# Patient Record
Sex: FEMALE | Race: BLACK OR AFRICAN AMERICAN | Employment: OTHER | URBAN - METROPOLITAN AREA
[De-identification: names, ages, dates, MRNs, and addresses within clinical notes are randomized per-mention and may not be internally consistent; named-entity substitution may affect disease eponyms.]

---

## 2023-11-17 PROBLEM — I61.9 INTRACEREBRAL HEMORRHAGE, NONTRAUMATIC (HCC): Status: ACTIVE | Noted: 2023-11-17

## 2023-12-01 ENCOUNTER — HOSPITAL ENCOUNTER (INPATIENT)
Facility: HOSPITAL | Age: 53
DRG: 058 | End: 2023-12-01
Attending: EMERGENCY MEDICINE | Admitting: EMERGENCY MEDICINE
Payer: MEDICAID

## 2023-12-01 DIAGNOSIS — I62.9 INTRACRANIAL HEMORRHAGE (HCC): Primary | ICD-10-CM

## 2023-12-01 PROCEDURE — 6370000000 HC RX 637 (ALT 250 FOR IP): Performed by: EMERGENCY MEDICINE

## 2023-12-01 PROCEDURE — 1180000000 HC REHAB R&B

## 2023-12-01 RX ORDER — LEVOTHYROXINE SODIUM 88 UG/1
88 TABLET ORAL
Status: DISCONTINUED | OUTPATIENT
Start: 2023-12-02 | End: 2023-12-20 | Stop reason: HOSPADM

## 2023-12-01 RX ORDER — LEVETIRACETAM 500 MG/1
500 TABLET ORAL 2 TIMES DAILY
Status: DISCONTINUED | OUTPATIENT
Start: 2023-12-01 | End: 2023-12-20 | Stop reason: HOSPADM

## 2023-12-01 RX ORDER — ACETAMINOPHEN 325 MG/1
650 TABLET ORAL EVERY 4 HOURS PRN
Status: DISCONTINUED | OUTPATIENT
Start: 2023-12-01 | End: 2023-12-20 | Stop reason: HOSPADM

## 2023-12-01 RX ORDER — POLYETHYLENE GLYCOL 3350 17 G/17G
17 POWDER, FOR SOLUTION ORAL DAILY PRN
Status: DISCONTINUED | OUTPATIENT
Start: 2023-12-01 | End: 2023-12-20 | Stop reason: HOSPADM

## 2023-12-01 RX ORDER — AMLODIPINE BESYLATE 10 MG/1
10 TABLET ORAL DAILY
Status: DISCONTINUED | OUTPATIENT
Start: 2023-12-02 | End: 2023-12-20 | Stop reason: HOSPADM

## 2023-12-01 RX ORDER — LOSARTAN POTASSIUM 50 MG/1
50 TABLET ORAL DAILY
Status: DISCONTINUED | OUTPATIENT
Start: 2023-12-02 | End: 2023-12-20 | Stop reason: HOSPADM

## 2023-12-01 RX ADMIN — LEVETIRACETAM 500 MG: 500 TABLET, FILM COATED ORAL at 21:47

## 2023-12-01 ASSESSMENT — PAIN SCALES - GENERAL: PAINLEVEL_OUTOF10: 0

## 2023-12-02 LAB
ANION GAP SERPL CALC-SCNC: 6 MMOL/L (ref 3–18)
BASOPHILS # BLD: 0.1 K/UL (ref 0–0.1)
BASOPHILS NFR BLD: 2 % (ref 0–2)
BUN SERPL-MCNC: 20 MG/DL (ref 7–18)
BUN/CREAT SERPL: 18 (ref 12–20)
CALCIUM SERPL-MCNC: 10.1 MG/DL (ref 8.5–10.1)
CHLORIDE SERPL-SCNC: 108 MMOL/L (ref 100–111)
CO2 SERPL-SCNC: 25 MMOL/L (ref 21–32)
CREAT SERPL-MCNC: 1.12 MG/DL (ref 0.6–1.3)
DIFFERENTIAL METHOD BLD: ABNORMAL
EOSINOPHIL # BLD: 0.2 K/UL (ref 0–0.4)
EOSINOPHIL NFR BLD: 5 % (ref 0–5)
ERYTHROCYTE [DISTWIDTH] IN BLOOD BY AUTOMATED COUNT: 14.5 % (ref 11.6–14.5)
GLUCOSE SERPL-MCNC: 89 MG/DL (ref 74–99)
HCT VFR BLD AUTO: 40.1 % (ref 35–45)
HGB BLD-MCNC: 12.6 G/DL (ref 12–16)
IMM GRANULOCYTES # BLD AUTO: 0 K/UL (ref 0–0.04)
IMM GRANULOCYTES NFR BLD AUTO: 0 % (ref 0–0.5)
LYMPHOCYTES # BLD: 1.1 K/UL (ref 0.9–3.6)
LYMPHOCYTES NFR BLD: 24 % (ref 21–52)
MAGNESIUM SERPL-MCNC: 2.1 MG/DL (ref 1.6–2.6)
MCH RBC QN AUTO: 27.2 PG (ref 24–34)
MCHC RBC AUTO-ENTMCNC: 31.4 G/DL (ref 31–37)
MCV RBC AUTO: 86.4 FL (ref 78–100)
MONOCYTES # BLD: 0.4 K/UL (ref 0.05–1.2)
MONOCYTES NFR BLD: 8 % (ref 3–10)
NEUTS SEG # BLD: 2.9 K/UL (ref 1.8–8)
NEUTS SEG NFR BLD: 62 % (ref 40–73)
NRBC # BLD: 0 K/UL (ref 0–0.01)
NRBC BLD-RTO: 0 PER 100 WBC
PLATELET # BLD AUTO: 293 K/UL (ref 135–420)
PMV BLD AUTO: 8.8 FL (ref 9.2–11.8)
POTASSIUM SERPL-SCNC: 4.4 MMOL/L (ref 3.5–5.5)
RBC # BLD AUTO: 4.64 M/UL (ref 4.2–5.3)
SODIUM SERPL-SCNC: 139 MMOL/L (ref 136–145)
WBC # BLD AUTO: 4.7 K/UL (ref 4.6–13.2)

## 2023-12-02 PROCEDURE — 97116 GAIT TRAINING THERAPY: CPT

## 2023-12-02 PROCEDURE — 97162 PT EVAL MOD COMPLEX 30 MIN: CPT

## 2023-12-02 PROCEDURE — 6370000000 HC RX 637 (ALT 250 FOR IP): Performed by: EMERGENCY MEDICINE

## 2023-12-02 PROCEDURE — 97112 NEUROMUSCULAR REEDUCATION: CPT

## 2023-12-02 PROCEDURE — 97166 OT EVAL MOD COMPLEX 45 MIN: CPT

## 2023-12-02 PROCEDURE — 97530 THERAPEUTIC ACTIVITIES: CPT

## 2023-12-02 PROCEDURE — 97535 SELF CARE MNGMENT TRAINING: CPT

## 2023-12-02 PROCEDURE — 99223 1ST HOSP IP/OBS HIGH 75: CPT | Performed by: INTERNAL MEDICINE

## 2023-12-02 PROCEDURE — 83735 ASSAY OF MAGNESIUM: CPT

## 2023-12-02 PROCEDURE — 80048 BASIC METABOLIC PNL TOTAL CA: CPT

## 2023-12-02 PROCEDURE — 85025 COMPLETE CBC W/AUTO DIFF WBC: CPT

## 2023-12-02 PROCEDURE — 36415 COLL VENOUS BLD VENIPUNCTURE: CPT

## 2023-12-02 PROCEDURE — 97542 WHEELCHAIR MNGMENT TRAINING: CPT

## 2023-12-02 PROCEDURE — 1180000000 HC REHAB R&B

## 2023-12-02 RX ADMIN — LEVETIRACETAM 500 MG: 500 TABLET, FILM COATED ORAL at 09:27

## 2023-12-02 RX ADMIN — LEVOTHYROXINE SODIUM 88 MCG: 0.05 TABLET ORAL at 06:21

## 2023-12-02 RX ADMIN — LEVETIRACETAM 500 MG: 500 TABLET, FILM COATED ORAL at 21:25

## 2023-12-02 RX ADMIN — LOSARTAN POTASSIUM 50 MG: 50 TABLET, FILM COATED ORAL at 09:27

## 2023-12-02 RX ADMIN — AMLODIPINE BESYLATE 10 MG: 10 TABLET ORAL at 09:27

## 2023-12-02 ASSESSMENT — PAIN SCALES - GENERAL
PAINLEVEL_OUTOF10: 0

## 2023-12-02 NOTE — PLAN OF CARE
Problem: Occupational Therapy - Adult  Goal: By Discharge: Performs self-care activities at highest level of function for planned discharge setting. See evaluation for individualized goals. Description: Occupational Therapy Goals   Long Term Goals  Initiated (2023) and to be accomplished within 4 week(s)  1. Pt will perform self-feeding with Mod I.  2. Pt will perform grooming with set-up. 3. Pt will perform UB bathing with supv. 4. Pt will perform LB bathing with CGA using AE and/ or compensatory strategies as needed. 5. Pt will perform tub/shower transfer with CGA using least restrictive assistive device. 6. Pt will perform UB dressing with SBA using rohini technique. 7. Pt will perform LB dressing with CGA using AE and/ or compensatory strategies as needed. 8. Pt will perform toileting task with CGA. 9. Pt will perform toilet transfer with CGA using least restrictive assistive device. Short Term Goals   Initiated (2023) and to be accomplished within 7 day(s), (by 2023)  1. Pt will perform self-feeding with supv. 2. Pt will perform grooming with CGA. 3. Pt will perform UB bathing with Min A.  4. Pt will perform LB bathing with Mod A using AE and/ or compensatory strategies as needed. 5. Pt will perform tub/shower transfer with Mod A using least restrictive assistive device. 6. Pt will perform UB dressing with Min A using rohini technique. 7. Pt will perform LB dressing with Mod A using AE and/ or compensatory strategies as needed. 8. Pt will perform toileting task with Mod A.  9. Pt will perform toilet transfer with Mod A using least restrictive assistive device. Outcome: Progressing   OCCUPATIONAL THERAPY EVALUATION    Patient: Mike Stewart 46 y.o.   Date: 2023  Primary Diagnosis: Intracranial hemorrhage (HCC) [I62.9]    Patient identified with name and : yes    Past Medical History:   Past Medical History:   Diagnosis Date    Hypothyroid      Precautions: patient safety needs and functional performance. Please refer to the flow sheet for vital signs taken during this treatment. After treatment:   [x] Patient left in no apparent distress sitting up in wheelchair with needs met  [] Patient left in no apparent distress in bed  [] Patient handoff to SLP/PT  [x] Call bell left within reach  [x] Nursing notified  [] Caregiver present  [x] Wheelchair alarm activated    Order received from MD for occupational therapy services and chart reviewed. Pt to be seen 5 times per week for 3 hours of total therapy per day for 4 weeks.   Thank you for the referral.    IRF-SHARAN Completed: yes  Entered Differentiated Treatment minutes: yes    Thank you for this referral.  Leopoldo Chesterfield, OT  12/2/2023

## 2023-12-02 NOTE — H&P
[id number] [facility] PHYSICAL REHABILITATION  [address] [place] [address], [address] [place] [address]     INPATIENT REHABILITATION  HISTORY AND PHYSICAL  (Post Admission Physician Evaluation)    Name: Charity Javier CSN: [id number]   Age: 46 y.o. MRN: [id number]   Sex: female Admit Date: 12/1/2023     PCP: Dr. Corey Toscano, Shavonne Park MD        Primary Rehabilitation Impairment Category (HEMALATHA): Stroke    Impairment Group Label: Stroke involving left body/right brain    Etiologic Diagnosis: Nontraumatic intracerebral hemorrhage, unspecified      Subjective:     Patient seen and examined. History of the Present Illness: The patient is a 62-year-old -American female with past medical history of hypothyroidism who was admitted to [facility] on 11/17/23 due to hemorrhagic stroke and hypertensive emergency. Patient initially presented to emergency room for further evaluation of headache and left-sided weakness. Patient was found to have intracranial hemorrhage involving right parietal lobe with small volume subarachnoid hemorrhage. CTA head and neck did not show any aneurysm. Patient was admitted to ICU and was followed by neurosurgery and neurologist.  Patient was given Cardene drip for hypertensive emergency initially and started on Keppra for seizure precautions. Patient started getting better clinically but continues to have generalized weakness. PT and OT evaluated this patient for impaired mobility and ADLs. Patient was felt to be a good candidate for acute inpatient rehabilitation. Upon evaluation by Physical Therapy and Occupational Therapy, the patient was recommended for acute inpatient rehabilitation. The patient was discharged and was subsequently admitted to the [facility] for Physical Rehabilitation for intensive rehabilitation to help recover strength, function and mobility. Patient is feeling better. No headaches currently. Has had some left leg numbness. Mild constipation.   No nausea or the pre-admission screening and on this post-admission evaluation. The preadmission screen and findings from therapy evaluations both support my post admission physician evaluation, deeming this patient to be an appropriate candidate for the IRF. The patient requires multidisciplinary treatment, physician oversight and intensive therapy not provided at a lower level of care. By signing this document, I acknowledge that I have personally performed a full physical examination on this patient within 24 hours of admission to this inpatient rehabilitation facility and have determined the patient to be able to tolerate the above course of treatment at an intensive level for a reasonable period of time. Total clinical care time is 75 minutes, including review of chart including all labs, radiology, past medical history, and discussion with patient. Greater than 50% of my time was spent in coordination of care and counseling. Signed:    Hector Mishra MD    December 2, 2023    Disclaimer: Sections of this note are dictated using utilizing voice recognition software, which may have resulted in some phonetic based errors in grammar and contents. Even though attempts were made to correct all the mistakes, some may have been missed, and remained in the body of the document. If questions arise, please contact our department.

## 2023-12-02 NOTE — PLAN OF CARE
Problem: Physical Therapy - Adult  Goal: By Discharge: Performs mobility at highest level of function for planned discharge setting. See evaluation for individualized goals. Description: Physical Therapy Short Term Goals  Initiated 12/2/2023 and to be accomplished within 7 day(s) [12/9/23]  1. Patient will roll right with CGA and use of rail and roll left with modified independence with bed rail. 2.  Patient will perform supine to sit and sit to supine in bed with minimal assistance with bed rail. 3.  Patient will transfer from bed to chair and chair to bed with minimal assistance using the least restrictive device. 4.  Patient will perform sit to stand with minimal assistance. 5.  Patient will ambulate with minimal assistance for 50 feet with the least restrictive device. 6.  Patient will propel w/c 150 ft with rohini technique using R UE and R Le and supervision for mobility on unit. Physical Therapy Long Term Goals  Initiated 12/2/2023 and to be accomplished within 28 day(s) [12/30/23]  1. Patient will supine to sit, sit to supine, roll right, and roll left in bed with modified independence. 2.  Patient will transfer from bed to chair and chair to bed with modified independence using the least restrictive device. 3.  Patient will perform sit to stand with modified independence. 4.  Patient will ambulate with supervision for 150 feet with the least restrictive device. 5.  Patient will ascend/descend 4 stairs with right handrail(s) with contact guard assist.  Outcome: Progressing     PHYSICAL THERAPY EVALUATION    Patient: Marleni Martinez (41 y.o. female)  Date: 12/2/2023  Diagnosis: Intracranial hemorrhage (HCC) [I62.9]   Precautions: Aspiration Risk;Fall Risk (L wide weakness and L side inattention)  Chart, physical therapy assessment, plan of care and goals were reviewed.     1335  Time In: 1335  Time Out: 3699  Minutes: 70  Entered Differentiated Treatment minutes: Yes    Patient seen for: Patient left in no apparent distress in bed  [] Patient left in no apparent distress sitting up in chair  [] Patient left in no apparent distress in w/c mobilizing under own power  [] Patient left in no apparent distress dining area  [] Patient left in no apparent distress mobilizing under own power  [x] Call bell left within reach  [x] Nursing notified  [] Caregiver present  [] Bed alarm activated   [] Chair alarm activated. COMMUNICATION/EDUCATION:   [x]         Fall prevention education was provided and the patient/caregiver indicated understanding. [x]         Patient/family have participated as able in goal setting and plan of care. [x]         Patient/family agree to work toward stated goals and plan of care. []         Patient understands intent and goals of therapy, but is neutral about his/her participation. []         Patient is unable to participate in goal setting and plan of care.       Thank you for this referral.  Nola Geller, PT  12/2/2023

## 2023-12-02 NOTE — PLAN OF CARE
Problem: Safety - Adult  Goal: Free from fall injury  Outcome: Progressing     Problem: Respiratory - Adult  Goal: Achieves optimal ventilation and oxygenation  Outcome: Progressing     Problem: Cardiovascular - Adult  Goal: Maintains optimal cardiac output and hemodynamic stability  Outcome: Progressing     Problem: Cardiovascular - Adult  Goal: Absence of cardiac dysrhythmias or at baseline  Outcome: Progressing     Problem: Skin/Tissue Integrity - Adult  Goal: Skin integrity remains intact  Outcome: Progressing  Flowsheets (Taken 12/1/2023 1949)  Skin Integrity Remains Intact: Monitor for areas of redness and/or skin breakdown     Problem: Musculoskeletal - Adult  Goal: Return mobility to safest level of function  Outcome: Progressing     Problem: Gastrointestinal - Adult  Goal: Maintains adequate nutritional intake  Outcome: Progressing     Problem: Genitourinary - Adult  Goal: Absence of urinary retention  Outcome: Progressing     Problem: Infection - Adult  Goal: Absence of infection at discharge  Outcome: Progressing

## 2023-12-03 PROCEDURE — 6370000000 HC RX 637 (ALT 250 FOR IP): Performed by: EMERGENCY MEDICINE

## 2023-12-03 PROCEDURE — 1180000000 HC REHAB R&B

## 2023-12-03 RX ADMIN — LEVETIRACETAM 500 MG: 500 TABLET, FILM COATED ORAL at 20:10

## 2023-12-03 RX ADMIN — AMLODIPINE BESYLATE 10 MG: 10 TABLET ORAL at 08:18

## 2023-12-03 RX ADMIN — POLYETHYLENE GLYCOL 3350 17 G: 17 POWDER, FOR SOLUTION ORAL at 08:19

## 2023-12-03 RX ADMIN — LEVETIRACETAM 500 MG: 500 TABLET, FILM COATED ORAL at 08:18

## 2023-12-03 RX ADMIN — ACETAMINOPHEN 325MG 650 MG: 325 TABLET ORAL at 08:18

## 2023-12-03 RX ADMIN — LEVOTHYROXINE SODIUM 88 MCG: 0.05 TABLET ORAL at 06:25

## 2023-12-03 RX ADMIN — LOSARTAN POTASSIUM 50 MG: 50 TABLET, FILM COATED ORAL at 08:18

## 2023-12-03 ASSESSMENT — PAIN SCALES - GENERAL
PAINLEVEL_OUTOF10: 0
PAINLEVEL_OUTOF10: 2
PAINLEVEL_OUTOF10: 0
PAINLEVEL_OUTOF10: 0

## 2023-12-03 ASSESSMENT — PAIN DESCRIPTION - DESCRIPTORS: DESCRIPTORS: ACHING

## 2023-12-03 ASSESSMENT — PAIN DESCRIPTION - ORIENTATION: ORIENTATION: POSTERIOR

## 2023-12-03 ASSESSMENT — PAIN DESCRIPTION - LOCATION: LOCATION: HEAD

## 2023-12-03 ASSESSMENT — PAIN DESCRIPTION - FREQUENCY: FREQUENCY: INTERMITTENT

## 2023-12-03 ASSESSMENT — PAIN DESCRIPTION - ONSET: ONSET: GRADUAL

## 2023-12-03 NOTE — PLAN OF CARE
Problem: Safety - Adult  Goal: Free from fall injury  12/3/2023 1029 by Tadeo Rossi RN  Outcome: Progressing  Flowsheets (Taken 12/1/2023 2300 by Kym Carlin RN)  Free From Fall Injury: Instruct family/caregiver on patient safety  12/2/2023 2320 by Zuleyma Gallegos RN  Outcome: Progressing     Problem: Skin/Tissue Integrity - Adult  Goal: Skin integrity remains intact  Outcome: Progressing  Flowsheets (Taken 12/3/2023 0833)  Skin Integrity Remains Intact: Monitor for areas of redness and/or skin breakdown     Problem: Musculoskeletal - Adult  Goal: Return mobility to safest level of function  Outcome: Progressing  Flowsheets (Taken 12/3/2023 0833)  Return Mobility to Safest Level of Function:   Assess patient stability and activity tolerance for standing, transferring and ambulating with or without assistive devices   Assist with transfers and ambulation using safe patient handling equipment as needed   Instruct patient/family in ordered activity level  Goal: Return ADL status to a safe level of function  Outcome: Progressing  Flowsheets (Taken 12/3/2023 0833)  Return ADL Status to a Safe Level of Function:   Administer medication as ordered   Assess activities of daily living deficits and provide assistive devices as needed   Assist and instruct patient to increase activity and self care as tolerated     Problem: Pain  Goal: Verbalizes/displays adequate comfort level or baseline comfort level  Outcome: Progressing

## 2023-12-04 PROCEDURE — 6370000000 HC RX 637 (ALT 250 FOR IP): Performed by: EMERGENCY MEDICINE

## 2023-12-04 PROCEDURE — 97112 NEUROMUSCULAR REEDUCATION: CPT

## 2023-12-04 PROCEDURE — 99232 SBSQ HOSP IP/OBS MODERATE 35: CPT | Performed by: EMERGENCY MEDICINE

## 2023-12-04 PROCEDURE — 1180000000 HC REHAB R&B

## 2023-12-04 PROCEDURE — 97530 THERAPEUTIC ACTIVITIES: CPT

## 2023-12-04 PROCEDURE — 97542 WHEELCHAIR MNGMENT TRAINING: CPT

## 2023-12-04 PROCEDURE — 97110 THERAPEUTIC EXERCISES: CPT

## 2023-12-04 PROCEDURE — 97116 GAIT TRAINING THERAPY: CPT

## 2023-12-04 RX ORDER — DOCUSATE SODIUM 100 MG/1
100 CAPSULE, LIQUID FILLED ORAL 2 TIMES DAILY
Status: DISCONTINUED | OUTPATIENT
Start: 2023-12-04 | End: 2023-12-20 | Stop reason: HOSPADM

## 2023-12-04 RX ADMIN — LEVETIRACETAM 500 MG: 500 TABLET, FILM COATED ORAL at 08:32

## 2023-12-04 RX ADMIN — AMLODIPINE BESYLATE 10 MG: 10 TABLET ORAL at 08:32

## 2023-12-04 RX ADMIN — DOCUSATE SODIUM 100 MG: 100 CAPSULE, LIQUID FILLED ORAL at 20:14

## 2023-12-04 RX ADMIN — LOSARTAN POTASSIUM 50 MG: 50 TABLET, FILM COATED ORAL at 08:32

## 2023-12-04 RX ADMIN — LEVETIRACETAM 500 MG: 500 TABLET, FILM COATED ORAL at 20:15

## 2023-12-04 RX ADMIN — LEVOTHYROXINE SODIUM 88 MCG: 0.05 TABLET ORAL at 06:06

## 2023-12-04 ASSESSMENT — PAIN SCALES - GENERAL
PAINLEVEL_OUTOF10: 0

## 2023-12-04 NOTE — PROGRESS NOTES
conducted an initial consultation and Spiritual Assessment for Jessica Shepherd, who is a 46 y.o.,female. Patient's Primary Language is: [place]. According to the patient's EMR Rastafarian Affiliation is: Monty. The reason the Patient came to the hospital is:   Patient Active Problem List    Diagnosis Date Noted    Intracranial hemorrhage ([address]) 12/01/2023    Intracerebral hemorrhage, nontraumatic ([address]) 11/17/2023        The  provided the following Interventions:  Initiated a relationship of care and support. Explored issues of mikaela, belief, spirituality and Rastafari/ritual needs while hospitalized. Listened empathically. Offered prayer and assurance of continued prayers on patient's behalf. Chart reviewed. The following outcomes where achieved:  Patient shared limited information about both their medical narrative and spiritual journey/beliefs.  confirmed Patient's Rastafarian Affiliation. Patient processed feeling about current hospitalization. Patient expressed gratitude for 's visit. Assessment:  Patient does not have any Rastafari/cultural needs that will affect patient's preferences in health care. There are no spiritual or Rastafari issues which require intervention at this time. Plan:  Chaplains will continue to follow and will provide pastoral care on an as needed/requested basis.  recommends bedside caregivers page  on duty if patient shows signs of acute spiritual or emotional distress.     [facility] USMD   Staff [id number] [facility]  [phone]

## 2023-12-04 NOTE — PROGRESS NOTES
Staff attempted 2 times to see patient for the initial  interview however, was unable to complete due to the patient working with therapy. Will reattempt interview at a later time.

## 2023-12-04 NOTE — PLAN OF CARE
[address] [facility] PHYSICAL REHABILITATION  [address] [facility]    Name: Dinesh Miller CSN: [id number]   Age: 46 y.o. MRN: [id number]   Sex: female Admit Date: 12/1/2023     Primary Rehabilitation Diagnosis: Impaired Mobility and ADLs secondary to acute hemorrhagic in the right parietal lobe and small subarachnoid hemorrhage with residual left hemiparesis    Other comorbid conditions being managed in the rehab  Hypertension  Recent acute kidney injury  Hypokalemia  Hypothyroidism  GERD    Plan    -Impaired Mobility and ADLs secondary to acute hemorrhagic in the right parietal lobe and small subarachnoid hemorrhage with residual left hemiparesis  Physical therapy, Occupational Therapy, speech therapy  Fall precautions  Continue Keppra 500 mg twice daily    -Hypertension  Continue amlodipine 10 mg daily  Continue losartan 50 mg daily  Monitor blood pressure    -Recent acute kidney injury  Monitor renal function intermittently    -Hypokalemia  Monitor potassium and electrolytes intermittently    -Hypothyroidism  Continue Synthroid    -GERD  Continue proton pump inhibitor    I discussed the treatment plan with the patient and she verbalized understanding      Recent Labs     12/02/23  0515   WBC 4.7   HGB 12.6   HCT 40.1        Recent Labs     12/02/23  0515      K 4.4      CO2 25   BUN 20*   MG 2.1        ANTICIPATED INTERVENTIONS THAT SUPPORT THE MEDICAL NECESSITY OF THIS ADMISSION:    I. Physical Therapy              A. Intensity: 1-2 hour per day              B. Frequency: 5 times per week              C. Duration: 2 weeks              D. Short Term Goals:    Initiated 12/2/2023 and to be accomplished within 7 day(s) [12/9/23]  1. Patient will roll right with CGA and use of rail and roll left with modified independence with bed rail.     2.  Patient will perform supine to sit and sit to supine in bed with minimal assistance with bed rail.  3.  Patient will transfer from bed to chair and chair to bed with minimal assistance using the least restrictive device. 4.  Patient will perform sit to stand with minimal assistance. 5.  Patient will ambulate with minimal assistance for 50 feet with the least restrictive device. 6.  Patient will propel w/c 150 ft with rohini technique using R UE and R Le and supervision for mobility on unit. E. Long Term Goals:   Initiated 12/2/2023 and to be accomplished within 28 day(s) [12/30/23]  1. Patient will supine to sit, sit to supine, roll right, and roll left in bed with modified independence. 2.  Patient will transfer from bed to chair and chair to bed with modified independence using the least restrictive device. 3.  Patient will perform sit to stand with modified independence. 4.  Patient will ambulate with supervision for 150 feet with the least restrictive device. 5.  Patient will ascend/descend 4 stairs with right handrail(s) with contact guard assist.    F. Interventions:                  Pt would benefit from skilled physical therapy in order to improve independent functional mobility within the home. Interventions may include range of motion (AROM, PROM B LE/trunk), motor function (B LE/trunk strengthening/coordination), activity tolerance (vitals, oxygen saturation levels), bed mobility training, balance activities, gait training (progressive ambulation program), wheelchair management and functional transfer training. Patient would also benefit from concurrent and group therapy sessions to promote increased participation in skilled therapy interventions and to provide opportunities for increased social interaction. II.  Occupational Therapy              A. Intensity: 1-2 hour per day              B. Frequency: 5 times per week              C. Duration: 2 weeks              D. Short Term Goals:    Initiated (12/2/2023) and to be accomplished within 7 day(s), (by 12/8/2023)  1. Pt will perform self-feeding with supv. 2. Pt will perform grooming with CGA. 3. Pt will perform UB bathing with Min A.  4. Pt will perform LB bathing with Mod A using AE and/ or compensatory strategies as needed. 5. Pt will perform tub/shower transfer with Mod A using least restrictive assistive device. 6. Pt will perform UB dressing with Min A using rohini technique. 7. Pt will perform LB dressing with Mod A using AE and/ or compensatory strategies as needed. 8. Pt will perform toileting task with Mod A.  9. Pt will perform toilet transfer with Mod A using least restrictive assistive device. E. Long Term Goals:    Initiated (12/2/2023) and to be accomplished within 4 week(s)  1. Pt will perform self-feeding with Mod I.  2. Pt will perform grooming with set-up. 3. Pt will perform UB bathing with supv. 4. Pt will perform LB bathing with CGA using AE and/ or compensatory strategies as needed. 5. Pt will perform tub/shower transfer with CGA using least restrictive assistive device. 6. Pt will perform UB dressing with SBA using rohini technique. 7. Pt will perform LB dressing with CGA using AE and/ or compensatory strategies as needed. 8. Pt will perform toileting task with CGA. 9. Pt will perform toilet transfer with CGA using least restrictive assistive device   F. Interventions:                            Pt would benefit from skilled occupational therapy in order to improve independent functional mobility/ADLs,/IADLs within the home. Interventions may include range of motion (AROM, PROM B UE), motor function (B UE/ strengthening/coordination), activity tolerance (vitals, oxygen saturation levels), balance training, ADL/IADL training and functional transfer training. PHYSICIAN'S ASSESSMENT OF FINDINGS:    Are the established goals sufficient for achieving the optimal level of function? [x]  Yes      []  No    What changes would you recommend to the goals as written?

## 2023-12-04 NOTE — PLAN OF CARE
Problem: Physical Therapy - Adult  Goal: By Discharge: Performs mobility at highest level of function for planned discharge setting. See evaluation for individualized goals. Description: Physical Therapy Short Term Goals  Initiated 12/2/2023 and to be accomplished within 7 day(s) [12/9/23]  1. Patient will roll right with CGA and use of rail and roll left with modified independence with bed rail. 2.  Patient will perform supine to sit and sit to supine in bed with minimal assistance with bed rail. 3.  Patient will transfer from bed to chair and chair to bed with minimal assistance using the least restrictive device. 4.  Patient will perform sit to stand with minimal assistance. 5.  Patient will ambulate with minimal assistance for 50 feet with the least restrictive device. 6.  Patient will propel w/c 150 ft with rohini technique using R UE and R Le and supervision for mobility on unit. Physical Therapy Long Term Goals  Initiated 12/2/2023 and to be accomplished within 28 day(s) [12/30/23]  1. Patient will supine to sit, sit to supine, roll right, and roll left in bed with modified independence. 2.  Patient will transfer from bed to chair and chair to bed with modified independence using the least restrictive device. 3.  Patient will perform sit to stand with modified independence. 4.  Patient will ambulate with supervision for 150 feet with the least restrictive device. 5.  Patient will ascend/descend 4 stairs with right handrail(s) with contact guard assist.  Outcome: Progressing     PHYSICAL THERAPY TREATMENT    Patient: Chantale Vela (67 y.o. female)  Date: 12/4/2023  Diagnosis: Intracranial hemorrhage ([address]) [I62.9]   Precautions: falls, fall anxiety  Chart, physical therapy assessment, plan of care and goals were reviewed. Time in: 0907  Time out : 1037    Patient seen for: therapeutic activities/exercises, gait/wheelchair training.     Pain:  Pt pain was reported as 0/10 no apparent distress in bed  [] Patient left in no apparent distress sitting up in chair  [] Patient left in no apparent distress in w/c mobilizing under own power  [] Patient left in no apparent distress dining area  [] Patient left in no apparent distress mobilizing under own power  [x] Call bell left within reach  [x] Nursing notified  [] Caregiver present  [] Bed alarm activated   [] Chair alarm activated        Papa Lyn, PT, DPT  12/4/2023

## 2023-12-04 NOTE — PLAN OF CARE
Problem: Occupational Therapy - Adult  Goal: By Discharge: Performs self-care activities at highest level of function for planned discharge setting. See evaluation for individualized goals. Description: Occupational Therapy Goals   Long Term Goals  Initiated (2023) and to be accomplished within 4 week(s)  1. Pt will perform self-feeding with Mod I.  2. Pt will perform grooming with set-up. 3. Pt will perform UB bathing with supv. 4. Pt will perform LB bathing with CGA using AE and/ or compensatory strategies as needed. 5. Pt will perform tub/shower transfer with CGA using least restrictive assistive device. 6. Pt will perform UB dressing with SBA using rohini technique. 7. Pt will perform LB dressing with CGA using AE and/ or compensatory strategies as needed. 8. Pt will perform toileting task with CGA. 9. Pt will perform toilet transfer with CGA using least restrictive assistive device. Short Term Goals   Initiated (2023) and to be accomplished within 7 day(s), (by 2023)  1. Pt will perform self-feeding with supv. 2. Pt will perform grooming with CGA. 3. Pt will perform UB bathing with Min A.  4. Pt will perform LB bathing with Mod A using AE and/ or compensatory strategies as needed. 5. Pt will perform tub/shower transfer with Mod A using least restrictive assistive device. 6. Pt will perform UB dressing with Min A using rohini technique. 7. Pt will perform LB dressing with Mod A using AE and/ or compensatory strategies as needed. 8. Pt will perform toileting task with Mod A.  9. Pt will perform toilet transfer with Mod A using least restrictive assistive device. Outcome: Progressing   Occupational Therapy TREATMENT    Patient: Jessica Shepherd   46 y.o.     Patient identified with name and : yes    Date: 2023    First Tx Session  Time In: 5734  Time Out: 1635    Diagnosis: Intracranial hemorrhage ([address]) [I62.9]   Precautions: Restrictions/Precautions: Aspiration Risk, Fall flowsheet for vital signs taken during this treatment.   After treatment:   [x]  Patient left in no apparent distress sitting up in wheelchair with needs met  []  Patient left in no apparent distress in bed  []  Patient handoff to SLP/PT  [x]  Call bell and immediate needs left within reach  []  Nursing notified  []  Caregiver present  [x]  Wheelchair alarm activated    Entered Differentiated Treatment minutes: yes    Sheng Beverly OT  12/4/2023

## 2023-12-05 PROCEDURE — 97535 SELF CARE MNGMENT TRAINING: CPT

## 2023-12-05 PROCEDURE — 97542 WHEELCHAIR MNGMENT TRAINING: CPT

## 2023-12-05 PROCEDURE — 6370000000 HC RX 637 (ALT 250 FOR IP): Performed by: EMERGENCY MEDICINE

## 2023-12-05 PROCEDURE — 97112 NEUROMUSCULAR REEDUCATION: CPT

## 2023-12-05 PROCEDURE — 97110 THERAPEUTIC EXERCISES: CPT

## 2023-12-05 PROCEDURE — 1180000000 HC REHAB R&B

## 2023-12-05 PROCEDURE — 99232 SBSQ HOSP IP/OBS MODERATE 35: CPT | Performed by: EMERGENCY MEDICINE

## 2023-12-05 PROCEDURE — 97116 GAIT TRAINING THERAPY: CPT

## 2023-12-05 PROCEDURE — 97530 THERAPEUTIC ACTIVITIES: CPT

## 2023-12-05 PROCEDURE — 97150 GROUP THERAPEUTIC PROCEDURES: CPT

## 2023-12-05 RX ADMIN — DOCUSATE SODIUM 100 MG: 100 CAPSULE, LIQUID FILLED ORAL at 20:29

## 2023-12-05 RX ADMIN — DOCUSATE SODIUM 100 MG: 100 CAPSULE, LIQUID FILLED ORAL at 08:10

## 2023-12-05 RX ADMIN — LEVETIRACETAM 500 MG: 500 TABLET, FILM COATED ORAL at 08:10

## 2023-12-05 RX ADMIN — LOSARTAN POTASSIUM 50 MG: 50 TABLET, FILM COATED ORAL at 08:10

## 2023-12-05 RX ADMIN — AMLODIPINE BESYLATE 10 MG: 10 TABLET ORAL at 08:10

## 2023-12-05 RX ADMIN — LEVETIRACETAM 500 MG: 500 TABLET, FILM COATED ORAL at 20:29

## 2023-12-05 RX ADMIN — LEVOTHYROXINE SODIUM 88 MCG: 0.05 TABLET ORAL at 06:20

## 2023-12-05 ASSESSMENT — PAIN SCALES - GENERAL
PAINLEVEL_OUTOF10: 0

## 2023-12-05 NOTE — PROGRESS NOTES
[facility]  Team Conference  Date: 12/5/2023  ACTIVE MONITORING OF CO-MORBID CONDITIONS/MANAGEMENT OF NEW MEDICAL ISSUES: Intracranial hemorrhage ([address]) [I62.9]    **Please refer to patient's electronic medical record to view the care plan and goals**  NURSING Making gains Yes   Skin Care: Skin Integumentary   Skin Color: Hypopigmentation    Wound Care: none         Pain: Pain Assessment  Pain Assessment: None - Denies Pain (12/05/23 1245)  Pain Level: 0 (12/05/23 1245)  Patient's Stated Pain Goal: 0 - No pain (12/03/23 1615)  Pain Location: Head (12/03/23 0818)  Pain Orientation: Posterior (12/03/23 0818)  Pain Descriptors: Aching (12/03/23 0818)  Pain Frequency: Intermittent (12/03/23 0818)  Pain Onset: Gradual (12/03/23 0818)  Non-Pharmaceutical Pain Intervention(s): Food;Repositioned (12/03/23 0818)  Response to Pain Intervention: Patient satisfied (12/03/23 0900)  Side Effects: No reported side effects (12/03/23 0900)    Bladder/Bowel Urine Assessment  Urinary Status: Voiding  Urinary Incontinence: Absent  Urine Color: Yellow/straw  Urine Appearance: Clear  Urine Odor: No odor Stool Assessment  Incontinence: No  Stool Appearance: Formed  Stool Color: Brown  Stool Amount: Small  Stool Source: Rectum  Last BM (including prior to admit): 12/05/23   Goal: Patient will have a BP within normal limits. Barrier: history of hypertension       Intervention: low sodium diet, medications      Lab value concerns   No       Occupational Therapy  Making gains  Yes      Goal: Patient will perform toileting with 25% assistance for clothing management and hygiene.           Barrier: Decreased independence with ADLS, left sided weakness, impaired balance and coordination         Intervention: ADL training, neuro re-ed, transfer training, patient education            ADL Accessibility Limitations:none          Physical Therapy Making gains Yes   Goal: Patient will walk 50 ft with rolling walker

## 2023-12-05 NOTE — PLAN OF CARE
Problem: Physical Therapy - Adult  Goal: By Discharge: Performs mobility at highest level of function for planned discharge setting. See evaluation for individualized goals. Description: Physical Therapy Short Term Goals  Initiated 12/2/2023 and to be accomplished within 7 day(s) [12/9/23]  1. Patient will roll right with CGA and use of rail and roll left with modified independence with bed rail. 2.  Patient will perform supine to sit and sit to supine in bed with minimal assistance with bed rail. 3.  Patient will transfer from bed to chair and chair to bed with minimal assistance using the least restrictive device. 4.  Patient will perform sit to stand with minimal assistance. 5.  Patient will ambulate with minimal assistance for 50 feet with the least restrictive device. 6.  Patient will propel w/c 150 ft with rohini technique using R UE and R Le and supervision for mobility on unit. Physical Therapy Long Term Goals  Initiated 12/2/2023 and to be accomplished within 28 day(s) [12/30/23]  1. Patient will supine to sit, sit to supine, roll right, and roll left in bed with modified independence. 2.  Patient will transfer from bed to chair and chair to bed with modified independence using the least restrictive device. 3.  Patient will perform sit to stand with modified independence. 4.  Patient will ambulate with supervision for 150 feet with the least restrictive device. 5.  Patient will ascend/descend 4 stairs with right handrail(s) with contact guard assist.  12/5/2023 1750 by RODY GallegosA  Outcome: Progressing  12/5/2023 1602 by Triston Courtney, PT  Outcome: Progressing   PHYSICAL THERAPY GROUP THERAPY TREATMENT    Patient: Page Reynoso (51 y.o. female)  Date: 12/5/2023  Diagnosis: Intracranial hemorrhage ([address]) [I62.9]  Precautions:        Time In: 1600  Time Out: 6590      Patient seen for: Group therapy session    Pain:  Pt pain was reported as not reported pre-treatment.   Pt pain was reported as not reported post-treatment. Patient identified with name and :Yes    SUBJECTIVE:      Patient agreeable to group therapy session. Yes    OBJECTIVE DATA SUMMARY:    Objective:     Standing balance  Activity   Sets   Reps   Time Spent   Comments   [] Beach ball toss/catch       [] Balloon tapping       [x] Forward/multi-directional reaching with tabletop activity  2  8.5', 3' Pt played connect 4 tabletop aracely while standing with a RW to challenge static standing balance. [] Bean bag toss to central target       [] Ball tossing into basketball hoop           ASSESSMENT:  [x]      Patient participated fully in group therapy session and able to tolerate all activities  []      Patient able to participate in group therapy session with only minor modifications and/or extended rest breaks needed. []      Patient not able to tolerate group therapy session. PLAN:  Patient continues to benefit from skilled intervention to address functional impairments. [x]   Patient appropriate to continue group therapy sessions to promote increased participation in skilled therapy interventions and to provide opportunities for increased social interaction.           After treatment:   [x] Patient left in no apparent distress in bed  [] Patient left in no apparent distress sitting up in chair  [] Patient left in no apparent distress in w/c mobilizing under own power  [] Patient left in no apparent distress dining area  [] Patient left in no apparent distress mobilizing under own power  [x] Call bell left within reach  [] Nursing notified  [] Caregiver present  [] Bed alarm activated   [] Chair alarm activated      Flynn Alpers, SPTA  2023

## 2023-12-05 NOTE — PLAN OF CARE
feedback. Verbal cues with demonstration for hand placement and technique. Rest break between sets due to fatigue. Neuro re-education Daily Assessment    Patient performed ball presses (15 reps x3 sets) using hand over hand technique (right hand over left) positioned on physio-ball supported on pt's thighs. Therapist providing initial instruction with hands on facilitation as needed. Performed for facilitating weight bearing and proprioceptive input/ awareness for improved functional use of pt's hemiparetic upper extremity during functional tasks. Rest break between sets due to fatigue. LOWER BODY DRESSING/UNDRESSING Daily Assessment   Functional Level Min A using rohini sock aid   Clinical Factors Edu/ instructed on use of AE for doffing/ donning slipper socks using dressing stick and rohini-sock aid while seated at edge of bed. FUNCTIONAL MOBILITY Daily Assessment    Functional - Mobility Device: Wheelchair  Activity: To/From therapy gym  Assist Level: Minimal assistance  Functional Mobility Comments: Functional w/c mobility performed (CGA/ Min A) using rohini technique (RUE/ RLE) to propel over level surface; pt requiring assistance with managing L brake/ brake extender when locking/ unlocking brakes. Verbal cues for managing brakes. Minimal assistance (Functional stand step transfer performed (Min A) using FWW; gait belt. Verbal cues for hand placement/ foot placement, sequencing, and safety. Intermittent hands on facilitation for securing left hand placement on walker.)      WHEELCHAIR/BED TRANSFER INDEPENDENCE Daily Assessment   Transfer Technique Stand step   Level of Assistance Minimal assistance   Equipment Wheelchair; Other (FWW; gait belt)   Comments Functional stand step transfer performed (Min A) using FWW; gait belt (EOB > w/c). Verbal cues for hand placement/ foot placement, sequencing, and safety. Intermittent hands on facilitation for securing left hand placement on walker.      Activity hand clasped technique for shoulder flexion to 90 degrees; elbow flexion; elbow flexion 'v' pattern, supination/ pronation, and rock the baby motion 15  2 Verbal cues with demonstration for use of hand clasped technique; rest breaks between sets due to fatigue   Shoulder shrugs 15 2    Shoulder rolls 10 2      ASSESSMENT:  Patient continues to benefit from skilled intervention to address functional impairments. Patient is appropriate to continue group therapy sessions to promote increased participation in skilled therapy interventions and to provide opportunities for increased social interaction. Progression toward goals:  [x]          Patient participated fully in group therapy session and able to tolerate all activities  []          Patient able to participate in group therapy session with only minor modifications and/or extended rest breaks needed  []          Patient not able to tolerate group therapy session. PLAN:  Patient continues to benefit from skilled intervention to address the above impairments. Continue treatment per established plan of care. Discharge Recommendations:   Home health occupational therapy with 24 hr assist  Further Equipment Recommendations for Discharge:   bedside commode; shower chair; TBD pending progress   Estimated LOS: 12/20/2023     COMMUNICATION/EDUCATION:   [] Home safety education was provided and the patient/caregiver indicated understanding. [x] Patient/family have participated as able in goal setting and plan of care. [x] Patient/family agree to work toward stated goals and plan of care. [] Patient understands intent and goals of therapy, but is neutral about his/her participation. [] Patient is unable to participate in goal setting and plan of care. Please refer to the flowsheet for vital signs taken during this treatment.   After treatment:   [x]  Patient left in no apparent distress sitting up in wheelchair with needs met  []  Patient left in no apparent distress in bed  []  Patient handoff to SLP/PT  [x]  Call bell and immediate needs left within reach  [x]  Nursing notified  []  Caregiver present  [x]  Wheelchair alarm activated    Entered Differentiated Treatment minutes: yes    Riaz Mesa OT  12/5/2023

## 2023-12-05 NOTE — PLAN OF CARE
Problem: Physical Therapy - Adult  Goal: By Discharge: Performs mobility at highest level of function for planned discharge setting. See evaluation for individualized goals. Description: Physical Therapy Short Term Goals  Initiated 12/2/2023 and to be accomplished within 7 day(s) [12/9/23]  1. Patient will roll right with CGA and use of rail and roll left with modified independence with bed rail. 2.  Patient will perform supine to sit and sit to supine in bed with minimal assistance with bed rail. 3.  Patient will transfer from bed to chair and chair to bed with minimal assistance using the least restrictive device. 4.  Patient will perform sit to stand with minimal assistance. 5.  Patient will ambulate with minimal assistance for 50 feet with the least restrictive device. 6.  Patient will propel w/c 150 ft with rohini technique using R UE and R Le and supervision for mobility on unit. Physical Therapy Long Term Goals  Initiated 12/2/2023 and to be accomplished within 28 day(s) [12/30/23]  1. Patient will supine to sit, sit to supine, roll right, and roll left in bed with modified independence. 2.  Patient will transfer from bed to chair and chair to bed with modified independence using the least restrictive device. 3.  Patient will perform sit to stand with modified independence. 4.  Patient will ambulate with supervision for 150 feet with the least restrictive device. 5.  Patient will ascend/descend 4 stairs with right handrail(s) with contact guard assist.  Outcome: Progressing     PHYSICAL THERAPY TREATMENT    Patient: Evan Solorzano (61 y.o. female)  Date: 12/5/2023  Diagnosis: Intracranial hemorrhage ([address]) [I62.9]   Precautions: Fall precautions, Left hemiparesis  Chart, physical therapy assessment, plan of care and goals were reviewed. Time in: 1200  Time out : 1230  Time in: 1330  Time out : 1400  Patient seen for:  Ther ex, Ther act, Gait Training, NMRE, Transfer Training    Pain:  Pt The patient's wheelchair was lowered today in order to allow for propulsion and steering using RLE to assist    Comments The patient propelled wheelchair using RUE/RLE     THERAPEUTIC EXERCISES Daily Assessment     The patient is noted to have hemiparesis LLE, however is capable of volitional contraction with the exception of DF/PF. EXERCISE   Sets   Reps   Active Active Assist   Passive Self ROM   Comments   Ankle Pumps    [] [] [] []    Quad Sets/Glut Sets   [] [] [] []    Hamstring Curls 3 10 [x] [] [] [] With TB   Short Arc Quads   [] [] [] []    Heel Slides   [] [] [] []    Straight Leg Raises   [] [] [] []    Hip Abd/Add 3 10 [x] [] [] [] Add with bolster, Abd with TB   Long Arc Quads 3 10 [x] [] [] [] Sitting edge of mat   Seated Marching 3 10 [x] [] [] [] Sitting edge of mat   Standing Marching   [] [] [] []       [] [] [] []      Neuro Re-Education:  4 inch step ups RLE and LLE to increase proprioception and weight shifting    ASSESSMENT:  The patient was pleasant and cooperative during treatment session today without any c/o pain or discomfort following session. The patient is noted to have left hemiparesis without any noted apraxia at this time. Progression toward goals:  [x]      Improving appropriately and progressing toward goals  []      Improving slowly and progressing toward goals  []      Not making progress toward goals and plan of care will be adjusted      PLAN:  Patient continues to benefit from skilled intervention to address the above impairments. Continue treatment per established plan of care. Discharge Recommendations:  home with continuous supervision and home health therapy services   Further Equipment Recommendations for Discharge:  FWW                    Estimated Discharge Date: 2-3 weeks TBD    Activity Tolerance:   Fair  Please refer to the flowsheet for vital signs taken during this treatment.     After treatment:   [] Patient left in no apparent distress in bed  [x] Patient left in no apparent distress sitting up in chair  [] Patient left in no apparent distress in w/c mobilizing under own power  [] Patient left in no apparent distress dining area  [] Patient left in no apparent distress mobilizing under own power  [x] Call bell left within reach  [] Nursing notified  [] Caregiver present  [] Bed alarm activated   [x] Chair alarm activated        Zeina Meier, PT  12/5/2023

## 2023-12-06 PROCEDURE — 97116 GAIT TRAINING THERAPY: CPT

## 2023-12-06 PROCEDURE — 97542 WHEELCHAIR MNGMENT TRAINING: CPT

## 2023-12-06 PROCEDURE — 99232 SBSQ HOSP IP/OBS MODERATE 35: CPT | Performed by: EMERGENCY MEDICINE

## 2023-12-06 PROCEDURE — 97535 SELF CARE MNGMENT TRAINING: CPT

## 2023-12-06 PROCEDURE — 1180000000 HC REHAB R&B

## 2023-12-06 PROCEDURE — 6370000000 HC RX 637 (ALT 250 FOR IP): Performed by: EMERGENCY MEDICINE

## 2023-12-06 PROCEDURE — 97112 NEUROMUSCULAR REEDUCATION: CPT

## 2023-12-06 PROCEDURE — 97530 THERAPEUTIC ACTIVITIES: CPT

## 2023-12-06 PROCEDURE — 97110 THERAPEUTIC EXERCISES: CPT

## 2023-12-06 RX ADMIN — DOCUSATE SODIUM 100 MG: 100 CAPSULE, LIQUID FILLED ORAL at 22:06

## 2023-12-06 RX ADMIN — LOSARTAN POTASSIUM 50 MG: 50 TABLET, FILM COATED ORAL at 09:16

## 2023-12-06 RX ADMIN — LEVETIRACETAM 500 MG: 500 TABLET, FILM COATED ORAL at 09:16

## 2023-12-06 RX ADMIN — LEVOTHYROXINE SODIUM 88 MCG: 0.05 TABLET ORAL at 06:15

## 2023-12-06 RX ADMIN — DOCUSATE SODIUM 100 MG: 100 CAPSULE, LIQUID FILLED ORAL at 09:16

## 2023-12-06 RX ADMIN — AMLODIPINE BESYLATE 10 MG: 10 TABLET ORAL at 09:16

## 2023-12-06 RX ADMIN — LEVETIRACETAM 500 MG: 500 TABLET, FILM COATED ORAL at 22:06

## 2023-12-06 ASSESSMENT — PAIN SCALES - GENERAL
PAINLEVEL_OUTOF10: 0

## 2023-12-06 ASSESSMENT — PAIN SCALES - WONG BAKER: WONGBAKER_NUMERICALRESPONSE: 0

## 2023-12-06 NOTE — PLAN OF CARE
Problem: Occupational Therapy - Adult  Goal: By Discharge: Performs self-care activities at highest level of function for planned discharge setting. See evaluation for individualized goals. Description: Occupational Therapy Goals   Long Term Goals  Initiated (2023) and to be accomplished within 4 week(s)  1. Pt will perform self-feeding with Mod I.  2. Pt will perform grooming with set-up. 3. Pt will perform UB bathing with supv. 4. Pt will perform LB bathing with CGA using AE and/ or compensatory strategies as needed. 5. Pt will perform tub/shower transfer with CGA using least restrictive assistive device. 6. Pt will perform UB dressing with SBA using rohini technique. 7. Pt will perform LB dressing with CGA using AE and/ or compensatory strategies as needed. 8. Pt will perform toileting task with CGA. 9. Pt will perform toilet transfer with CGA using least restrictive assistive device. Short Term Goals   Initiated (2023) and to be accomplished within 7 day(s), (by 2023)  1. Pt will perform self-feeding with supv. 2. Pt will perform grooming with CGA. 3. Pt will perform UB bathing with Min A.  4. Pt will perform LB bathing with Mod A using AE and/ or compensatory strategies as needed. 5. Pt will perform tub/shower transfer with Mod A using least restrictive assistive device. 6. Pt will perform UB dressing with Min A using rohini technique. 7. Pt will perform LB dressing with Mod A using AE and/ or compensatory strategies as needed. 8. Pt will perform toileting task with Mod A.  9. Pt will perform toilet transfer with Mod A using least restrictive assistive device. Outcome: Progressing   Occupational Therapy TREATMENT    Patient: Marleni Martinez   46 y.o.     Patient identified with name and : yes    Date: 2023    First Tx Session  Time In: 0935  Time Out: 1110    Diagnosis: Intracranial hemorrhage ([address]) [I62.9]   Precautions:  Restrictions/Precautions: Aspiration Risk, Fall functional mobility for return to prior level of functioning. Progression toward goals:  []          Improving appropriately and progressing toward goals  [x]          Improving slowly and progressing toward goals  []          Not making progress toward goals and plan of care will be adjusted      PLAN:  Patient continues to benefit from skilled intervention to address the above impairments. Continue treatment per established plan of care. Discharge Recommendations:  Home Health occupational therapy with 24 hr assist  Further Equipment Recommendations for Discharge:  shower chair  Estimated LOS: 12/20/2023; TBD     COMMUNICATION/EDUCATION:   [] Home safety education was provided and the patient/caregiver indicated understanding. [x] Patient/family have participated as able in goal setting and plan of care. [x] Patient/family agree to work toward stated goals and plan of care. [] Patient understands intent and goals of therapy, but is neutral about his/her participation. [] Patient is unable to participate in goal setting and plan of care. Please refer to the flowsheet for vital signs taken during this treatment.   After treatment:   [x]  Patient left in no apparent distress sitting up in wheelchair with needs met  []  Patient left in no apparent distress in bed  []  Patient handoff to SLP/PT  [x]  Call bell and immediate needs left within reach  [x]  Nursing notified  []  Caregiver present  [x]  Wheelchair alarm activated    Entered Differentiated Treatment minutes: yes    Briana De La Cruz OT  12/6/2023

## 2023-12-06 NOTE — PROGRESS NOTES
TEAM CONFERENCE FOLLOW-UP  Patient: Jose Panchal (32 y.o. female)  Date: 12/6/2023  Diagnosis: Intracranial hemorrhage ([facility]) [I62.9] Intracranial hemorrhage ([address])      Precautions:      Met with patient to discuss the findings from 12/6/2023 Team Conference.     Patient requested further information and/or had questions:   Myra Carpenter

## 2023-12-06 NOTE — PLAN OF CARE
Problem: Physical Therapy - Adult  Goal: By Discharge: Performs mobility at highest level of function for planned discharge setting.  See evaluation for individualized goals.  Description: Physical Therapy Short Term Goals  Initiated 12/2/2023 and to be accomplished within 7 day(s) [12/9/23]  1.  Patient will roll right with CGA and use of rail and roll left with modified independence with bed rail.    2.  Patient will perform supine to sit and sit to supine in bed with minimal assistance with bed rail.  3.  Patient will transfer from bed to chair and chair to bed with minimal assistance using the least restrictive device.  4.  Patient will perform sit to stand with minimal assistance.  5.  Patient will ambulate with minimal assistance for 50 feet with the least restrictive device.   6.  Patient will propel w/c 150 ft with rohini technique using R UE and R Le and supervision for mobility on unit.    Physical Therapy Long Term Goals  Initiated 12/2/2023 and to be accomplished within 28 day(s) [12/30/23]  1.  Patient will supine to sit, sit to supine, roll right, and roll left in bed with modified independence.    2.  Patient will transfer from bed to chair and chair to bed with modified independence using the least restrictive device.  3.  Patient will perform sit to stand with modified independence.  4.  Patient will ambulate with supervision for 150 feet with the least restrictive device.   5.  Patient will ascend/descend 4 stairs with right handrail(s) with contact guard assist.  Outcome: Progressing     PHYSICAL THERAPY TREATMENT    Patient: Shawn Olivera (53 y.o. female)  Date: 12/6/2023  Diagnosis: Intracranial hemorrhage (HCC) [I62.9]   Precautions: Fall precautions, Left hemiparesis  Chart, physical therapy assessment, plan of care and goals were reviewed.    Time in: 1115  Time out : 1245    Patient seen for: Ther ex, Ther act, Gait Training, Transfer Training, Stair Training, NMRE, Balance  Annalisa;Decreased step length;Decreased step height (decreased toe clearance and heel strike)    Assistive device Rolling Walker    Ambulation assistance - surface  Minimal assistance  Level tile    Distance 60 feet x 3    Comments The patient was noted to have improved hip and knee flexion at intitial swing phase today, however continues to have foot flat intial contact/weight accepatance     Ambulation-uneven surface              STEPS/STAIRS Daily Assessment     Steps/Stairs ambulated         Assistance Required      Rail Use      Comments      Curbs/Ramps             BALANCE Daily Assessment    Posture Fair    Sitting - Static Good;-    Sitting - Dynamic Fair    Standing - Static Fair (with FWW)    Standing - Dynamic Fair; - (with FWW)    Comments        WHEELCHAIR MOBILITY/MANAGEMENT Daily Assessment   Able to Propel 215 feet   Assist Level Supervision   Curbs/ramps assistance required     Wheelchair management     Comments The patient propels wheelchair using BLEs and RLE     THERAPEUTIC EXERCISES Daily Assessment     Refer to grid below        EXERCISE   Sets   Reps   Active Active Assist   Passive Self ROM   Comments   Ankle Pumps 3 10  [x] [] [] []    Quad Sets/Glut Sets   [] [] [] []    Hamstring Sets 3 10 [x] [] [] []    Short Arc Quads   [] [] [] []    Heel Slides   [] [] [] []    Straight Leg Raises 3 10 [x] [] [] []    Hip Abd/Add 3 10 [x] [] [] [] supine   Long Arc Quads 3 10 [x] [] [] []    Seated Marching   [] [] [] []    Standing Marching   [] [] [] []       [] [] [] []      Neuro Re-Education:  Standing weight shifting using shoulder arch to facilitate reaching across midline and outside standing MACK with verbal for shifting weight and achieving terminal knee extension LLE. 6 inch step ups with RLE to increase proprioception and weight bearing through LLE. 4 inch step ups with LLE to increase weight shifting to RLE.     ASSESSMENT:  The patient tolerated treatment session well without any c/o pain or discomfort following treatment session. The patient is progressing appropriately at this time. Progression toward goals:  [x]      Improving appropriately and progressing toward goals  []      Improving slowly and progressing toward goals  []      Not making progress toward goals and plan of care will be adjusted      PLAN:  Patient continues to benefit from skilled intervention to address the above impairments. Continue treatment per established plan of care. Discharge Recommendations:  home with 24 hr supervision and home health therapy services    Further Equipment Recommendations for Discharge: One Karly Guerrier                     Estimated Discharge Date: 12/20/23    Activity Tolerance:   Good   Please refer to the flowsheet for vital signs taken during this treatment.     After treatment:   [] Patient left in no apparent distress in bed  [x] Patient left in no apparent distress sitting up in chair  [] Patient left in no apparent distress in w/c mobilizing under own power  [] Patient left in no apparent distress dining area  [] Patient left in no apparent distress mobilizing under own power  [x] Call bell left within reach  [] Nursing notified  [] Caregiver present  [] Bed alarm activated   [x] Chair alarm activated        Valente Garcia, PT  12/6/2023

## 2023-12-07 PROCEDURE — 97150 GROUP THERAPEUTIC PROCEDURES: CPT

## 2023-12-07 PROCEDURE — 6370000000 HC RX 637 (ALT 250 FOR IP): Performed by: EMERGENCY MEDICINE

## 2023-12-07 PROCEDURE — 97542 WHEELCHAIR MNGMENT TRAINING: CPT

## 2023-12-07 PROCEDURE — 97112 NEUROMUSCULAR REEDUCATION: CPT

## 2023-12-07 PROCEDURE — 99222 1ST HOSP IP/OBS MODERATE 55: CPT | Performed by: NURSE PRACTITIONER

## 2023-12-07 PROCEDURE — 97110 THERAPEUTIC EXERCISES: CPT

## 2023-12-07 PROCEDURE — 1180000000 HC REHAB R&B

## 2023-12-07 PROCEDURE — 97530 THERAPEUTIC ACTIVITIES: CPT

## 2023-12-07 PROCEDURE — 97116 GAIT TRAINING THERAPY: CPT

## 2023-12-07 PROCEDURE — 97535 SELF CARE MNGMENT TRAINING: CPT

## 2023-12-07 RX ADMIN — LEVETIRACETAM 500 MG: 500 TABLET, FILM COATED ORAL at 21:02

## 2023-12-07 RX ADMIN — DOCUSATE SODIUM 100 MG: 100 CAPSULE, LIQUID FILLED ORAL at 08:44

## 2023-12-07 RX ADMIN — AMLODIPINE BESYLATE 10 MG: 10 TABLET ORAL at 08:44

## 2023-12-07 RX ADMIN — DOCUSATE SODIUM 100 MG: 100 CAPSULE, LIQUID FILLED ORAL at 21:02

## 2023-12-07 RX ADMIN — LEVOTHYROXINE SODIUM 88 MCG: 0.05 TABLET ORAL at 06:55

## 2023-12-07 RX ADMIN — LOSARTAN POTASSIUM 50 MG: 50 TABLET, FILM COATED ORAL at 08:44

## 2023-12-07 RX ADMIN — LEVETIRACETAM 500 MG: 500 TABLET, FILM COATED ORAL at 08:45

## 2023-12-07 ASSESSMENT — PAIN SCALES - WONG BAKER
WONGBAKER_NUMERICALRESPONSE: 0

## 2023-12-07 ASSESSMENT — PAIN SCALES - GENERAL
PAINLEVEL_OUTOF10: 0

## 2023-12-07 NOTE — PLAN OF CARE
Problem: Physical Therapy - Adult  Goal: By Discharge: Performs mobility at highest level of function for planned discharge setting. See evaluation for individualized goals. Description: Physical Therapy Short Term Goals  Initiated 12/2/2023 and to be accomplished within 7 day(s) [12/9/23]  1. Patient will roll right with CGA and use of rail and roll left with modified independence with bed rail. 2.  Patient will perform supine to sit and sit to supine in bed with minimal assistance with bed rail. 3.  Patient will transfer from bed to chair and chair to bed with minimal assistance using the least restrictive device. 4.  Patient will perform sit to stand with minimal assistance. 5.  Patient will ambulate with minimal assistance for 50 feet with the least restrictive device. 6.  Patient will propel w/c 150 ft with rohini technique using R UE and R Le and supervision for mobility on unit. Physical Therapy Long Term Goals  Initiated 12/2/2023 and to be accomplished within 28 day(s) [12/30/23]  1. Patient will supine to sit, sit to supine, roll right, and roll left in bed with modified independence. 2.  Patient will transfer from bed to chair and chair to bed with modified independence using the least restrictive device. 3.  Patient will perform sit to stand with modified independence. 4.  Patient will ambulate with supervision for 150 feet with the least restrictive device. 5.  Patient will ascend/descend 4 stairs with right handrail(s) with contact guard assist.  Outcome: Progressing    PHYSICAL THERAPY TREATMENT    Patient: Pauline oD (50 y.o. female)  Date: 12/7/2023  Diagnosis: Intracranial hemorrhage ([address]) [I62.9]   Precautions: Fall Precautions, Left hemiparesis  Chart, physical therapy assessment, plan of care and goals were reviewed. Time in: 1100  Time out : 1230  Time in: 1330  Time out : 1400  Group Time in: 1400  Group Time out : 1430  Patient seen for:  There ex, There act, Gait

## 2023-12-07 NOTE — PROGRESS NOTES
Pt is a 46year old female admitted to ARU. Pt is alert and oriented alone in the room. Pt states that she lives alone in a second floor condo with an elevator to enter and a walk in shower. Pt states that she was able to self care prior to admission and has no history with DME, home health, outpatient therapy and SNF. Pt states that she does not have a PCP and fills her medications at [facility]. Pt states that she has received her COVID vaccines. Pt states that she does not have a POA and notes that her mother, Keenan Denson ([phone]), is her NOK contact. Pt states that she is unclear who will be her helper at dc noting that her mother lives outside of the area. Sw encouraged pt to begin conversations to plan for help at NH. Pt affirms her insurance as [facility]. Sw reviewed insurance updates and dc planning. Pt states understanding and denies questions at this time. Sw will follow.

## 2023-12-07 NOTE — PROGRESS NOTES
Occupational Therapy Goals   Long Term Goals  Initiated (12/2/2023) and to be accomplished within 4 week(s)  1. Pt will perform self-feeding with Mod I.  2. Pt will perform grooming with set-up. 3. Pt will perform UB bathing with supv. 4. Pt will perform LB bathing with CGA using AE and/ or compensatory strategies as needed. 5. Pt will perform tub/shower transfer with CGA using least restrictive assistive device. 6. Pt will perform UB dressing with SBA using rohini technique. 7. Pt will perform LB dressing with CGA using AE and/ or compensatory strategies as needed. 8. Pt will perform toileting task with CGA. 9. Pt will perform toilet transfer with CGA using least restrictive assistive device. Short Term Goals   Initiated (12/2/2023) and to be accomplished within 7 day(s), (Updated 12/7/2023)  1. Pt will perform self-feeding with supv. ( 12/7/2023)  2. Pt will perform grooming with CGA. ( 12/7/2023, currently sup)  3. Pt will perform UB bathing with Min A. ( 12/7/2023, currently sup)  4. Pt will perform LB bathing with Mod A using AE and/ or compensatory strategies as needed. ( 12/7/2023, currently CGA)  5. Pt will perform tub/shower transfer with Mod A using least restrictive assistive device. ( 12/7/2023, currently Min A)  6. Pt will perform UB dressing with Min A using rohini technique. ( 12/7/2023, currently supervision)  7. Pt will perform LB dressing with Mod A using AE and/ or compensatory strategies as needed. ( 12/7/2023)  8. Pt will perform toileting task with Mod A. ( 12/7/2023)  9. Pt will perform toilet transfer with Mod A using least restrictive assistive device.  ( 12/7/2023, currently Min A)       OT WEEKLY PROGRESS NOTE  Jourdan Olivas    First Treatment Session  Time In: 0930  Time Out: 1100    Second Treatment Session (group session)  Time In: 1430  Time Out: 1500    Medical Diagnosis:  Intracranial hemorrhage ([address]) [I62.9] Intracranial hemorrhage ([address])

## 2023-12-08 PROCEDURE — 1180000000 HC REHAB R&B

## 2023-12-08 PROCEDURE — 97110 THERAPEUTIC EXERCISES: CPT

## 2023-12-08 PROCEDURE — 6370000000 HC RX 637 (ALT 250 FOR IP): Performed by: EMERGENCY MEDICINE

## 2023-12-08 PROCEDURE — 99222 1ST HOSP IP/OBS MODERATE 55: CPT | Performed by: HOSPITALIST

## 2023-12-08 PROCEDURE — 97112 NEUROMUSCULAR REEDUCATION: CPT

## 2023-12-08 PROCEDURE — 97530 THERAPEUTIC ACTIVITIES: CPT

## 2023-12-08 PROCEDURE — 97116 GAIT TRAINING THERAPY: CPT

## 2023-12-08 RX ADMIN — LOSARTAN POTASSIUM 50 MG: 50 TABLET, FILM COATED ORAL at 08:05

## 2023-12-08 RX ADMIN — LEVETIRACETAM 500 MG: 500 TABLET, FILM COATED ORAL at 20:22

## 2023-12-08 RX ADMIN — LEVETIRACETAM 500 MG: 500 TABLET, FILM COATED ORAL at 08:05

## 2023-12-08 RX ADMIN — LEVOTHYROXINE SODIUM 88 MCG: 0.05 TABLET ORAL at 06:00

## 2023-12-08 RX ADMIN — DOCUSATE SODIUM 100 MG: 100 CAPSULE, LIQUID FILLED ORAL at 20:22

## 2023-12-08 RX ADMIN — AMLODIPINE BESYLATE 10 MG: 10 TABLET ORAL at 08:05

## 2023-12-08 RX ADMIN — DOCUSATE SODIUM 100 MG: 100 CAPSULE, LIQUID FILLED ORAL at 08:05

## 2023-12-08 ASSESSMENT — PAIN SCALES - GENERAL
PAINLEVEL_OUTOF10: 0

## 2023-12-08 ASSESSMENT — PAIN SCALES - WONG BAKER
WONGBAKER_NUMERICALRESPONSE: 0

## 2023-12-08 NOTE — PLAN OF CARE
Problem: Occupational Therapy - Adult  Goal: By Discharge: Performs self-care activities at highest level of function for planned discharge setting. See evaluation for individualized goals. Description: Occupational Therapy Goals   Long Term Goals  Initiated (12/2/2023) and to be accomplished within 4 week(s)  1. Pt will perform self-feeding with Mod I.  2. Pt will perform grooming with set-up. 3. Pt will perform UB bathing with supv. 4. Pt will perform LB bathing with CGA using AE and/ or compensatory strategies as needed. 5. Pt will perform tub/shower transfer with CGA using least restrictive assistive device. 6. Pt will perform UB dressing with SBA using rohini technique. 7. Pt will perform LB dressing with CGA using AE and/ or compensatory strategies as needed. 8. Pt will perform toileting task with CGA. 9. Pt will perform toilet transfer with CGA using least restrictive assistive device. Short Term Goals   Initiated (12/2/2023) and to be accomplished within 7 day(s), (Updated 12/7/2023)  1. Pt will perform self-feeding with supv. ( 12/7/2023)  2. Pt will perform grooming with CGA. ( 12/7/2023, currently sup)  3. Pt will perform UB bathing with Min A. ( 12/7/2023, currently sup)  4. Pt will perform LB bathing with Mod A using AE and/ or compensatory strategies as needed. ( 12/7/2023, currently CGA)  5. Pt will perform tub/shower transfer with Mod A using least restrictive assistive device. ( 12/7/2023, currently Min A)  6. Pt will perform UB dressing with Min A using rohini technique. ( 12/7/2023, currently supervision)  7. Pt will perform LB dressing with Mod A using AE and/ or compensatory strategies as needed. ( 12/7/2023)  8. Pt will perform toileting task with Mod A. ( 12/7/2023)  9. Pt will perform toilet transfer with Mod A using least restrictive assistive device.  ( 12/7/2023, currently Min A)     Outcome: Progressing   Occupational Therapy TREATMENT    Patient: Maryanne Greymond performed x20 reps each. FUNCTIONAL MOBILITY Daily Assessment           Comments  Pt performed w/c mobility room to gym using BUE to propel forward. Pt requiring verbal cues and tactile cues to incorporate left UE into task. Pt requiring several rest breaks to complete due to increased fatigue. WHEELCHAIR/BED TRANSFER INDEPENDENCE Daily Assessment   Transfer Technique  Stand-step   Level of Assistance  CGA/Min A   Equipment  Wheelchair, gait belt, FWW   Comments  Verbal cues for correct hand placement       ASSESSMENT:  Pt making progress with improving activity tolerance and increasing BUE strength. Pt continues to require cuing to incorporate affected left UE into tasks. Progression toward goals:  [x]          Improving appropriately and progressing toward goals  []          Improving slowly and progressing toward goals  []          Not making progress toward goals and plan of care will be adjusted     PLAN:  Patient continues to benefit from skilled intervention to address the above impairments. Continue treatment per established plan of care. Discharge Recommendations:  Home Health occupational therapy with 24 hr assist    Further Equipment Recommendations for Discharge:  shower chair     Activity Tolerance:    Fair      COMMUNICATION:   [] Home safety education was provided and the patient/caregiver indicated understanding. [] Patient/family have participated as able in goal setting and plan of care. [x] Patient/family agree to work toward stated goals and plan of care. [] Patient understands intent and goals of therapy, but is neutral about his/her participation. [] Patient is unable to participate in goal setting and plan of care. Please refer to the flowsheet for vital signs taken during this treatment.   After treatment:   [x]  Patient left in no apparent distress sitting up in chair   []  Patient left in no apparent distress in bed  [x]  Call bell left within reach  []  Nursing

## 2023-12-08 NOTE — PLAN OF CARE
Problem: Physical Therapy - Adult  Goal: By Discharge: Performs mobility at highest level of function for planned discharge setting. See evaluation for individualized goals. Description: Physical Therapy Short Term Goals  Initiated 12/2/2023 and to be accomplished within 7 day(s) [12/9/23]  1. Patient will roll right with CGA and use of rail and roll left with modified independence with bed rail. 2.  Patient will perform supine to sit and sit to supine in bed with minimal assistance with bed rail. 3.  Patient will transfer from bed to chair and chair to bed with minimal assistance using the least restrictive device. 4.  Patient will perform sit to stand with minimal assistance. 5.  Patient will ambulate with minimal assistance for 50 feet with the least restrictive device. 6.  Patient will propel w/c 150 ft with rohini technique using R UE and R Le and supervision for mobility on unit. Physical Therapy Long Term Goals  Initiated 12/2/2023 and to be accomplished within 28 day(s) [12/30/23]  1. Patient will supine to sit, sit to supine, roll right, and roll left in bed with modified independence. 2.  Patient will transfer from bed to chair and chair to bed with modified independence using the least restrictive device. 3.  Patient will perform sit to stand with modified independence. 4.  Patient will ambulate with supervision for 150 feet with the least restrictive device. 5.  Patient will ascend/descend 4 stairs with right handrail(s) with contact guard assist.  Outcome: Progressing     PHYSICAL THERAPY TREATMENT    Patient: Dinesh Miller (31 y.o. female)  Date: 12/8/2023  Diagnosis: Intracranial hemorrhage ([facility]) [I62.9]   Precautions: falls  Chart, physical therapy assessment, plan of care and goals were reviewed.     Time in: 1130  Time out: 1200    Time in: 1400  Time out : 1530    Patient seen for: gait training, neuromuscular facilitation, therapeutic exercises    Pain:  Pt pain was reported length and balance with RW, CGA. One leg stand with B hands on RW x 30 secs hold x 2, SBA  Feet together stance x 30 SH x 3 sets no AD, SBA  Tandem stance x 10 secs x 2 sets no AD, SBA    ASSESSMENT:  Patient still shows fall apprehension with balance tasks and increased step length walking. Patient's exercises was focused on increasing step length and balance in standing. Patient responded well and understands the education on the principles of each exercise performed this date. Progression toward goals:  [x]      Improving appropriately and progressing toward goals  []      Improving slowly and progressing toward goals  []      Not making progress toward goals and plan of care will be adjusted      PLAN:  Patient continues to benefit from skilled intervention to address the above impairments. Continue treatment per established plan of care. Discharge Recommendations: Home health PT  Further Equipment Recommendations for Discharge: [facility]      Estimated Discharge Date: ~3 weeks    Activity Tolerance:   Please refer to the flowsheet for vital signs taken during this treatment.     After treatment:   [x] Patient left in no apparent distress in bed  [] Patient left in no apparent distress sitting up in chair  [] Patient left in no apparent distress in w/c mobilizing under own power  [] Patient left in no apparent distress dining area  [] Patient left in no apparent distress mobilizing under own power  [x] Call bell left within reach  [x] Nursing notified  [] Caregiver present  [] Bed alarm activated   [] Chair alarm activated        Iliana James PT, DPT  12/8/2023

## 2023-12-09 PROCEDURE — 1180000000 HC REHAB R&B

## 2023-12-09 PROCEDURE — 6370000000 HC RX 637 (ALT 250 FOR IP): Performed by: EMERGENCY MEDICINE

## 2023-12-09 RX ADMIN — DOCUSATE SODIUM 100 MG: 100 CAPSULE, LIQUID FILLED ORAL at 20:51

## 2023-12-09 RX ADMIN — DOCUSATE SODIUM 100 MG: 100 CAPSULE, LIQUID FILLED ORAL at 08:15

## 2023-12-09 RX ADMIN — LEVOTHYROXINE SODIUM 88 MCG: 0.05 TABLET ORAL at 06:06

## 2023-12-09 RX ADMIN — AMLODIPINE BESYLATE 10 MG: 10 TABLET ORAL at 08:32

## 2023-12-09 RX ADMIN — LEVETIRACETAM 500 MG: 500 TABLET, FILM COATED ORAL at 20:51

## 2023-12-09 RX ADMIN — LOSARTAN POTASSIUM 50 MG: 50 TABLET, FILM COATED ORAL at 08:32

## 2023-12-09 RX ADMIN — LEVETIRACETAM 500 MG: 500 TABLET, FILM COATED ORAL at 08:15

## 2023-12-09 ASSESSMENT — PAIN SCALES - GENERAL
PAINLEVEL_OUTOF10: 0

## 2023-12-09 ASSESSMENT — PAIN SCALES - WONG BAKER
WONGBAKER_NUMERICALRESPONSE: 0
WONGBAKER_NUMERICALRESPONSE: 0

## 2023-12-09 NOTE — PLAN OF CARE
Problem: Safety - Adult  Goal: Free from fall injury  Outcome: Progressing     Problem: Respiratory - Adult  Goal: Achieves optimal ventilation and oxygenation  Outcome: Progressing     Problem: Cardiovascular - Adult  Goal: Maintains optimal cardiac output and hemodynamic stability  Outcome: Progressing  Goal: Absence of cardiac dysrhythmias or at baseline  Outcome: Progressing     Problem: Skin/Tissue Integrity - Adult  Goal: Skin integrity remains intact  Outcome: Progressing     Problem: Musculoskeletal - Adult  Goal: Return mobility to safest level of function  Outcome: Progressing  Goal: Maintain proper alignment of affected body part  Outcome: Progressing  Goal: Return ADL status to a safe level of function  Outcome: Progressing     Problem: Gastrointestinal - Adult  Goal: Maintains adequate nutritional intake  Outcome: Progressing     Problem: Genitourinary - Adult  Goal: Absence of urinary retention  Outcome: Progressing     Problem: Infection - Adult  Goal: Absence of infection at discharge  Outcome: Progressing  Goal: Absence of infection during hospitalization  Outcome: Progressing     Problem: Physical Therapy - Adult  Goal: By Discharge: Performs mobility at highest level of function for planned discharge setting. See evaluation for individualized goals. Description: Physical Therapy Short Term Goals  Initiated 12/2/2023 and to be accomplished within 7 day(s) [12/9/23]  1. Patient will roll right with CGA and use of rail and roll left with modified independence with bed rail. 2.  Patient will perform supine to sit and sit to supine in bed with minimal assistance with bed rail. 3.  Patient will transfer from bed to chair and chair to bed with minimal assistance using the least restrictive device. 4.  Patient will perform sit to stand with minimal assistance. 5.  Patient will ambulate with minimal assistance for 50 feet with the least restrictive device.    6.  Patient will propel w/c 150 ft

## 2023-12-10 VITALS
SYSTOLIC BLOOD PRESSURE: 127 MMHG | RESPIRATION RATE: 18 BRPM | BODY MASS INDEX: 27.79 KG/M2 | HEART RATE: 100 BPM | OXYGEN SATURATION: 98 % | DIASTOLIC BLOOD PRESSURE: 82 MMHG | TEMPERATURE: 98.4 F | WEIGHT: 156.86 LBS | HEIGHT: 63 IN

## 2023-12-10 PROCEDURE — 1180000000 HC REHAB R&B

## 2023-12-10 PROCEDURE — 6370000000 HC RX 637 (ALT 250 FOR IP): Performed by: EMERGENCY MEDICINE

## 2023-12-10 RX ADMIN — LEVETIRACETAM 500 MG: 500 TABLET, FILM COATED ORAL at 08:34

## 2023-12-10 RX ADMIN — LEVETIRACETAM 500 MG: 500 TABLET, FILM COATED ORAL at 20:17

## 2023-12-10 RX ADMIN — LOSARTAN POTASSIUM 50 MG: 50 TABLET, FILM COATED ORAL at 08:35

## 2023-12-10 RX ADMIN — AMLODIPINE BESYLATE 10 MG: 10 TABLET ORAL at 08:34

## 2023-12-10 RX ADMIN — LEVOTHYROXINE SODIUM 88 MCG: 0.05 TABLET ORAL at 06:29

## 2023-12-10 RX ADMIN — DOCUSATE SODIUM 100 MG: 100 CAPSULE, LIQUID FILLED ORAL at 20:17

## 2023-12-10 RX ADMIN — DOCUSATE SODIUM 100 MG: 100 CAPSULE, LIQUID FILLED ORAL at 08:34

## 2023-12-10 ASSESSMENT — PAIN SCALES - GENERAL
PAINLEVEL_OUTOF10: 0

## 2023-12-11 PROCEDURE — 97110 THERAPEUTIC EXERCISES: CPT

## 2023-12-11 PROCEDURE — 97530 THERAPEUTIC ACTIVITIES: CPT

## 2023-12-11 PROCEDURE — 1180000000 HC REHAB R&B

## 2023-12-11 PROCEDURE — 97150 GROUP THERAPEUTIC PROCEDURES: CPT

## 2023-12-11 PROCEDURE — 97112 NEUROMUSCULAR REEDUCATION: CPT

## 2023-12-11 PROCEDURE — 97535 SELF CARE MNGMENT TRAINING: CPT

## 2023-12-11 PROCEDURE — 6370000000 HC RX 637 (ALT 250 FOR IP): Performed by: EMERGENCY MEDICINE

## 2023-12-11 PROCEDURE — 97116 GAIT TRAINING THERAPY: CPT

## 2023-12-11 PROCEDURE — 99232 SBSQ HOSP IP/OBS MODERATE 35: CPT | Performed by: EMERGENCY MEDICINE

## 2023-12-11 RX ADMIN — LEVOTHYROXINE SODIUM 88 MCG: 0.05 TABLET ORAL at 06:09

## 2023-12-11 RX ADMIN — LEVETIRACETAM 500 MG: 500 TABLET, FILM COATED ORAL at 07:37

## 2023-12-11 RX ADMIN — DOCUSATE SODIUM 100 MG: 100 CAPSULE, LIQUID FILLED ORAL at 07:37

## 2023-12-11 RX ADMIN — LEVETIRACETAM 500 MG: 500 TABLET, FILM COATED ORAL at 21:10

## 2023-12-11 RX ADMIN — DOCUSATE SODIUM 100 MG: 100 CAPSULE, LIQUID FILLED ORAL at 21:10

## 2023-12-11 RX ADMIN — LOSARTAN POTASSIUM 50 MG: 50 TABLET, FILM COATED ORAL at 07:37

## 2023-12-11 RX ADMIN — AMLODIPINE BESYLATE 10 MG: 10 TABLET ORAL at 07:37

## 2023-12-11 ASSESSMENT — PAIN SCALES - WONG BAKER: WONGBAKER_NUMERICALRESPONSE: 0

## 2023-12-11 ASSESSMENT — PAIN SCALES - GENERAL
PAINLEVEL_OUTOF10: 0

## 2023-12-11 NOTE — PLAN OF CARE
Problem: Occupational Therapy - Adult  Goal: By Discharge: Performs self-care activities at highest level of function for planned discharge setting. See evaluation for individualized goals. Description: Occupational Therapy Goals   Long Term Goals  Initiated (12/2/2023) and to be accomplished within 4 week(s)  1. Pt will perform self-feeding with Mod I.  2. Pt will perform grooming with set-up. 3. Pt will perform UB bathing with supv. 4. Pt will perform LB bathing with CGA using AE and/ or compensatory strategies as needed. 5. Pt will perform tub/shower transfer with CGA using least restrictive assistive device. 6. Pt will perform UB dressing with SBA using rohini technique. 7. Pt will perform LB dressing with CGA using AE and/ or compensatory strategies as needed. 8. Pt will perform toileting task with CGA. 9. Pt will perform toilet transfer with CGA using least restrictive assistive device. Short Term Goals   Initiated (12/2/2023) and to be accomplished within 7 day(s), (Updated 12/7/2023)  1. Pt will perform self-feeding with supv. ( 12/7/2023)  2. Pt will perform grooming with CGA. ( 12/7/2023, currently sup)  3. Pt will perform UB bathing with Min A. ( 12/7/2023, currently sup)  4. Pt will perform LB bathing with Mod A using AE and/ or compensatory strategies as needed. ( 12/7/2023, currently CGA)  5. Pt will perform tub/shower transfer with Mod A using least restrictive assistive device. ( 12/7/2023, currently Min A)  6. Pt will perform UB dressing with Min A using rohini technique. ( 12/7/2023, currently supervision)  7. Pt will perform LB dressing with Mod A using AE and/ or compensatory strategies as needed. ( 12/7/2023)  8. Pt will perform toileting task with Mod A. ( 12/7/2023)  9. Pt will perform toilet transfer with Mod A using least restrictive assistive device.  ( 12/7/2023, currently Min A)     Outcome: Progressing   Occupational Therapy TREATMENT    Patient: Madina Jolley EDUCATION:   Education Given To: Patient  Education Provided: Mobility Training;Transfer Training;Home Exercise Program;Energy Conservation; Safety;ADL Function  Education Method: Demonstration;Verbal  Barriers to Learning: None  Education Outcome: Verbalized understanding;Demonstrated understanding;Continued education needed    ASSESSMENT:  Patient continues to benefit from skilled occupational therapy to address decreased (I) with ADL's, left side weakness, left side inattention, impaired LUE ROM, impaired LUE FMC, decreased strength/ endurance, impaired balance/ coordination, safety, and impaired functional transfers/ mobility which impact pt performance, independence and safety with ADL/IADL's and functional mobility for return to prior level of functioning. Progression toward goals:  []          Improving appropriately and progressing toward goals  [x]          Improving slowly and progressing toward goals  []          Not making progress toward goals and plan of care will be adjusted      PLAN:  Patient continues to benefit from skilled intervention to address the above impairments. Continue treatment per established plan of care. Discharge Recommendations:  Home Health occupational therapy with 24 hr assist  Further Equipment Recommendations for Discharge:  shower chair; 3-in-1 commode  Estimated LOS: 12/20/2023   Occupational Therapy GROUP TREATMENT  SUBJECTIVE:     Patient agreeable to participate in occupational therapy group treatment session. OBJECTIVE DATA SUMMARY:     OCCUPATIONAL THERAPY GROUP   [facility] Comments   Seated UE Activity Patient participated in UE there act during [facility] card games with therapist and a fellow group member. Therapist providing initial instruction with intermittent cues during task performance.  Task performed for [facility], functional reach/ grasp, activity tolerance, problem solving skills, and to afford opportunity for increased social interaction for increased

## 2023-12-11 NOTE — PROGRESS NOTES
conducted a Follow up consultation and Spiritual Assessment for Pauline Do, who is a 46 y.o.,female. The reason patient came to hospital is:   Patient Active Problem List    Diagnosis Date Noted    Intracranial hemorrhage ([address]) 12/01/2023    Intracerebral hemorrhage, nontraumatic ([address]) 11/17/2023   . The  provided the following Interventions:  Continued the relationship of care and support. Listened empathically. Offered prayer and assurance of continued prayer on patients behalf. Chart reviewed. The following outcomes were achieved:  Patient expressed gratitude for 's visit. Assessment:  There are no further spiritual or Congregational issues which require Spiritual Care Services interventions at this time. Plan:  Chaplains will continue to follow and will provide pastoral care on an as needed/requested basis.  recommends bedside caregivers page  on duty if patient shows signs of acute spiritual or emotional distress. Per patient doing okay, just frustrated over not knowing daily schedule for the day.      [facility]  Staff [facility]   [phone]

## 2023-12-11 NOTE — PLAN OF CARE
Problem: Physical Therapy - Adult  Goal: By Discharge: Performs mobility at highest level of function for planned discharge setting. See evaluation for individualized goals. Description: Physical Therapy Short Term Goals  Initiated 12/2/2023 and to be accomplished within 7 day(s) [12/9/23]  1. Patient will roll right with CGA and use of rail and roll left with modified independence with bed rail. 2.  Patient will perform supine to sit and sit to supine in bed with minimal assistance with bed rail. 3.  Patient will transfer from bed to chair and chair to bed with minimal assistance using the least restrictive device. 4.  Patient will perform sit to stand with minimal assistance. 5.  Patient will ambulate with minimal assistance for 50 feet with the least restrictive device. 6.  Patient will propel w/c 150 ft with rohini technique using R UE and R Le and supervision for mobility on unit. Physical Therapy Long Term Goals  Initiated 12/2/2023 and to be accomplished within 28 day(s) [12/30/23]  1. Patient will supine to sit, sit to supine, roll right, and roll left in bed with modified independence. 2.  Patient will transfer from bed to chair and chair to bed with modified independence using the least restrictive device. 3.  Patient will perform sit to stand with modified independence. 4.  Patient will ambulate with supervision for 150 feet with the least restrictive device. 5.  Patient will ascend/descend 4 stairs with right handrail(s) with contact guard assist.  Outcome: Progressing     PHYSICAL THERAPY TREATMENT    Patient: Nehemiah Calderón (77 y.o. female)  Date: 12/11/2023  Diagnosis: Intracranial hemorrhage ([address]) [I62.9]   Precautions: Fall precautions, Left hemiparesis  Chart, physical therapy assessment, plan of care and goals were reviewed. Time in: 1100  Time out : 1200  Group Time in: 1200  Group Time out : 1230  Patient seen for:  There ex, There act, Gait Training, Transfer Training, MOBILITY/MANAGEMENT Daily Assessment   Able to Propel     Assist Level     Curbs/ramps assistance required     Wheelchair management     Comments      THERAPEUTIC EXERCISES Daily Assessment     Refer to grid below        EXERCISE   Sets   Reps   Active Active Assist   Passive Self ROM   Comments   Ankle Pumps DF/PF 3 10  [x] [] [] [] Standing   Quad Sets/Glut Sets   [] [] [] []    Hamstring Sets 3 10 [x] [] [] [] With red TB   Short Arc Quads   [] [] [] []    Heel Slides   [] [] [] []    Straight Leg Raises   [] [] [] []    Hip Abd/Add 3 10 [x] [] [] [] Add with bolster, Abd with red TB   Long Arc Quads 3 10 [x] [] [] [] 3# ankle weights   Seated Marching   [] [] [] []    Standing Marching 3 10 [x] [] [] [] 3 # ankle weights      [] [] [] []      Neuro Re-Education:  Standing lateral weight shifting in parallel bars with verbal cues for terminal knee extension LLE when shifting to the left. Standing marches with RLE ensuring left knee does not go into hyperextension to increase proprioception and weight bearing through LLE.    ASSESSMENT:  The patient is progressing well, however she continues to present with weak left anterior tibialis musculature leading to foot flat initial contact and intermittent hyperextension during stance phase LLE. The patient's letty has significantly improved with the patient noted to have improved posture secondary to not looking down at her feet constantly during ambulation.  Progression toward goals:  [x]      Improving appropriately and progressing toward goals  []      Improving slowly and progressing toward goals  []      Not making progress toward goals and plan of care will be adjusted      PLAN:  Patient continues to benefit from skilled intervention to address the above impairments.  Continue treatment per established plan of care.  Discharge Recommendations:  24 hour supervision or assist;Home with Home health PT  Further Equipment Recommendations for Discharge:

## 2023-12-12 LAB
ANION GAP SERPL CALC-SCNC: 4 MMOL/L (ref 3–18)
BASOPHILS # BLD: 0.1 K/UL (ref 0–0.1)
BASOPHILS NFR BLD: 1 % (ref 0–2)
BUN SERPL-MCNC: 18 MG/DL (ref 7–18)
BUN/CREAT SERPL: 16 (ref 12–20)
CALCIUM SERPL-MCNC: 9.9 MG/DL (ref 8.5–10.1)
CHLORIDE SERPL-SCNC: 110 MMOL/L (ref 100–111)
CO2 SERPL-SCNC: 24 MMOL/L (ref 21–32)
CREAT SERPL-MCNC: 1.11 MG/DL (ref 0.6–1.3)
DIFFERENTIAL METHOD BLD: ABNORMAL
EOSINOPHIL # BLD: 0.2 K/UL (ref 0–0.4)
EOSINOPHIL NFR BLD: 5 % (ref 0–5)
ERYTHROCYTE [DISTWIDTH] IN BLOOD BY AUTOMATED COUNT: 13.8 % (ref 11.6–14.5)
GLUCOSE SERPL-MCNC: 85 MG/DL (ref 74–99)
HCT VFR BLD AUTO: 39.4 % (ref 35–45)
HGB BLD-MCNC: 12.9 G/DL (ref 12–16)
IMM GRANULOCYTES # BLD AUTO: 0 K/UL (ref 0–0.04)
IMM GRANULOCYTES NFR BLD AUTO: 0 % (ref 0–0.5)
LYMPHOCYTES # BLD: 1.1 K/UL (ref 0.9–3.6)
LYMPHOCYTES NFR BLD: 24 % (ref 21–52)
MCH RBC QN AUTO: 27.9 PG (ref 24–34)
MCHC RBC AUTO-ENTMCNC: 32.7 G/DL (ref 31–37)
MCV RBC AUTO: 85.3 FL (ref 78–100)
MONOCYTES # BLD: 0.4 K/UL (ref 0.05–1.2)
MONOCYTES NFR BLD: 8 % (ref 3–10)
NEUTS SEG # BLD: 2.9 K/UL (ref 1.8–8)
NEUTS SEG NFR BLD: 62 % (ref 40–73)
NRBC # BLD: 0 K/UL (ref 0–0.01)
NRBC BLD-RTO: 0 PER 100 WBC
PLATELET # BLD AUTO: 287 K/UL (ref 135–420)
PMV BLD AUTO: 8.8 FL (ref 9.2–11.8)
POTASSIUM SERPL-SCNC: 4.1 MMOL/L (ref 3.5–5.5)
RBC # BLD AUTO: 4.62 M/UL (ref 4.2–5.3)
SODIUM SERPL-SCNC: 138 MMOL/L (ref 136–145)
WBC # BLD AUTO: 4.7 K/UL (ref 4.6–13.2)

## 2023-12-12 PROCEDURE — 1180000000 HC REHAB R&B

## 2023-12-12 PROCEDURE — 97542 WHEELCHAIR MNGMENT TRAINING: CPT

## 2023-12-12 PROCEDURE — 97530 THERAPEUTIC ACTIVITIES: CPT

## 2023-12-12 PROCEDURE — 97535 SELF CARE MNGMENT TRAINING: CPT

## 2023-12-12 PROCEDURE — 36415 COLL VENOUS BLD VENIPUNCTURE: CPT

## 2023-12-12 PROCEDURE — 6370000000 HC RX 637 (ALT 250 FOR IP): Performed by: EMERGENCY MEDICINE

## 2023-12-12 PROCEDURE — 97116 GAIT TRAINING THERAPY: CPT

## 2023-12-12 PROCEDURE — 97112 NEUROMUSCULAR REEDUCATION: CPT

## 2023-12-12 PROCEDURE — 80048 BASIC METABOLIC PNL TOTAL CA: CPT

## 2023-12-12 PROCEDURE — 85025 COMPLETE CBC W/AUTO DIFF WBC: CPT

## 2023-12-12 PROCEDURE — 99232 SBSQ HOSP IP/OBS MODERATE 35: CPT | Performed by: EMERGENCY MEDICINE

## 2023-12-12 PROCEDURE — 97110 THERAPEUTIC EXERCISES: CPT

## 2023-12-12 RX ADMIN — LEVETIRACETAM 500 MG: 500 TABLET, FILM COATED ORAL at 08:32

## 2023-12-12 RX ADMIN — LOSARTAN POTASSIUM 50 MG: 50 TABLET, FILM COATED ORAL at 08:32

## 2023-12-12 RX ADMIN — LEVOTHYROXINE SODIUM 88 MCG: 0.05 TABLET ORAL at 06:06

## 2023-12-12 RX ADMIN — LEVETIRACETAM 500 MG: 500 TABLET, FILM COATED ORAL at 20:49

## 2023-12-12 RX ADMIN — DOCUSATE SODIUM 100 MG: 100 CAPSULE, LIQUID FILLED ORAL at 20:49

## 2023-12-12 RX ADMIN — DOCUSATE SODIUM 100 MG: 100 CAPSULE, LIQUID FILLED ORAL at 08:32

## 2023-12-12 RX ADMIN — AMLODIPINE BESYLATE 10 MG: 10 TABLET ORAL at 08:32

## 2023-12-12 ASSESSMENT — PAIN SCALES - WONG BAKER
WONGBAKER_NUMERICALRESPONSE: 0

## 2023-12-12 ASSESSMENT — PAIN SCALES - GENERAL
PAINLEVEL_OUTOF10: 0

## 2023-12-12 NOTE — PLAN OF CARE
Problem: Safety - Adult  Goal: Free from fall injury  Outcome: Progressing     Problem: Respiratory - Adult  Goal: Achieves optimal ventilation and oxygenation  Outcome: Progressing     Problem: Cardiovascular - Adult  Goal: Maintains optimal cardiac output and hemodynamic stability  Outcome: Progressing  Goal: Absence of cardiac dysrhythmias or at baseline  Outcome: Progressing     Problem: Skin/Tissue Integrity - Adult  Goal: Skin integrity remains intact  Outcome: Progressing     Problem: Musculoskeletal - Adult  Goal: Return mobility to safest level of function  Outcome: Progressing  Goal: Maintain proper alignment of affected body part  Outcome: Progressing  Goal: Return ADL status to a safe level of function  Outcome: Progressing     Problem: Gastrointestinal - Adult  Goal: Maintains adequate nutritional intake  Outcome: Progressing     Problem: Genitourinary - Adult  Goal: Absence of urinary retention  Outcome: Progressing     Problem: Infection - Adult  Goal: Absence of infection at discharge  Outcome: Progressing  Goal: Absence of infection during hospitalization  Outcome: Progressing     Problem: Pain  Goal: Verbalizes/displays adequate comfort level or baseline comfort level  Outcome: Progressing

## 2023-12-12 NOTE — PLAN OF CARE
Problem: Physical Therapy - Adult  Goal: By Discharge: Performs mobility at highest level of function for planned discharge setting. See evaluation for individualized goals. Description: Physical Therapy Short Term Goals  Initiated 12/2/2023 and to be accomplished within 7 day(s) [12/9/23]  1. Patient will roll right with CGA and use of rail and roll left with modified independence with bed rail. (Goal Met for rolling left 12/11/23)       Update:  Patient will roll right with modified [place] and use of rail. To be reassessed 12/16/23  2. Patient will perform supine to sit and sit to supine in bed with minimal assistance with bed rail. (Goal Met 12/11/23)       Update: Patient will perform supine to sit and sit to supine in bed with SBA with bed rail. To be reassessed 12/16/23  3. Patient will transfer from bed to chair and chair to bed with minimal assistance using the least restrictive device. Goal Met 12/11/23)       Update: Patient will transfer from bed to chair and chair to bed with SBA using the least restrictive device. To be reassessed 12/16/23  4. Patient will perform sit to stand with minimal assistance. (Goal Met 12/11/23)       Update: Patient will perform sit to stand with Supervision. To be reassessed 12/16/23  5. Patient will ambulate with minimal assistance for 50 feet with the least restrictive device. (Goal Met 12/11/23)       Update: Patient will ambulate with supervision for 200 feet with the least restrictive device. 6.  Patient will propel w/c 150 ft with rohini technique using R UE and R Le and supervision for mobility on unit. (Goal Met 12/11/23)    Physical Therapy Long Term Goals  Initiated 12/2/2023 and to be accomplished within 28 day(s) [12/30/23]  1. Patient will supine to sit, sit to supine, roll right, and roll left in bed with modified independence.     2.  Patient will transfer from bed to chair and chair to bed with modified independence using the least restrictive [x] [] [] [] Standing   Quad Sets/Glut Sets   [] [] [] []    Hamstring Curls 3 10 [x] [] [] [] With red TB   Short Arc Quads   [] [] [] []    Heel Slides   [] [] [] []    Straight Leg Raises   [] [] [] []    Hip Abd/Add 3 10 [x] [] [] [] Add with bolster, Abd with red TB   Long Arc Quads 3 10 [x] [] [] [] 5# ankle weight RLE, 3# ankle weight LLE   Seated Marching   [] [] [] []    Standing Marching 3 10 [x] [] [] [] 5# ankle weight RLE, 3# ankle weight LLE      [] [] [] []        ASSESSMENT:  The patient has made significant improvement with all functional mobility tasks since her initial evaluation due to increased standing balance, BLE strength, sequencing, weight shifting capacity, activity tolerance. Progression toward goals:  [x]      Improving appropriately and progressing toward goals  []      Improving slowly and progressing toward goals  []      Not making progress toward goals and plan of care will be adjusted     PLAN:  Patient continues to benefit from skilled intervention to address the above impairments. Continue treatment per established plan of care. Discharge Recommendations:  Home with assist PRN;Home with Home health PT  Further Equipment Recommendations for Discharge:        Rolling            Estimated Discharge Date:12/20/23    Activity Tolerance:   Good  Please refer to the flowsheet for vital signs taken during this treatment.   After treatment:   [] Patient left in no apparent distress in bed  [] Patient left in no apparent distress sitting up in chair  [x] Patient left in no apparent distress in w/c mobilizing under own power  [] Patient left in no apparent distress dining area  [] Patient left in no apparent distress mobilizing under own power  [x] Call bell left within reach  [] Nursing notified  [] Caregiver present  [] Bed alarm activated   [x] Chair alarm activated        Fiona Bower, PT  12/12/2023

## 2023-12-12 NOTE — PROGRESS NOTES
[facility]  Team Conference  Date: 12/12/2023  ACTIVE MONITORING OF CO-MORBID CONDITIONS/MANAGEMENT OF NEW MEDICAL ISSUES: Intracranial hemorrhage ([address]) [I62.9]    **Please refer to patient's electronic medical record to view the care plan and goals**  NURSING Making gains Yes   Skin Care: Skin Integumentary   Skin Color: Hyperpigmentation    Wound Care: none         Pain: Pain Assessment  Pain Assessment: None - Denies Pain (12/12/23 1200)  Pain Level: 0 (12/12/23 1200)  Townsend-Baker Pain Rating: No hurt (12/12/23 1200)  Patient's Stated Pain Goal: 0 - No pain (12/12/23 1200)  Pain Location: Head (12/03/23 0818)  Pain Orientation: Posterior (12/03/23 0818)  Pain Descriptors: Aching (12/03/23 0818)  Pain Frequency: Intermittent (12/03/23 0818)  Pain Onset: Gradual (12/03/23 0818)  Non-Pharmaceutical Pain Intervention(s): Food;Repositioned (12/03/23 0818)  Response to Pain Intervention: Patient satisfied (12/03/23 0900)  Side Effects: No reported side effects (12/03/23 0900)    Bladder/Bowel Urine Assessment  Urinary Status: Voiding  Urinary Incontinence: Absent  Urine Color: Yellow/straw  Urine Appearance: Clear  Urine Odor: No odor Stool Assessment  Incontinence: No  Stool Appearance: Formed  Stool Color: Brown  Stool Amount: Large  Stool Source: Rectum  Last BM (including prior to admit): 12/09/23   Goal: Patient will veralize medication use, frequency and side effects. Barrier: lack of knowledge regarding medications      Intervention: medication education      Lab value concerns   No       Occupational Therapy  Making gains  Yes      Goal: Patient will perform toileting self care at a stand by assist level of care. Barrier: decreased independence, decreased coordination, left inattention         Intervention: ADL training, NMRE, therex, transfer training.             ADL Accessibility Limitations:none          Physical Therapy Making gains Yes   Goal: Patient will walk with a rolling walker for 150 feet at a supervision level with fewer verbal cues for left foot clearance. Barrier: decreased LLE strength and proprioception, impaired standing balance      Intervention: strength training, balance training, NMRE, gait training         Household Mobility Accessibility Limitations:none                                  Therapy Minutes Density Met: Yes    Therapy Minutes Not met Action/Justification:   [] Pt on medical hold  [] Pt refusing therapy despite encouragement and education on benefits of therapy  [] Pt displays decreased tolerance to therapy  [] Other     CMG Date: 12/17/2023  Estimated Discharge Date: 12/20/2023  [x]Rehabilitation goals from IPOC/Treatment plan reviewed  [x]No changes identified  []Goal(s) changed:     Patient needs identified [x]Caregiver Education   []Family Conference         Recommended Discharge Plan []home alone   []home alone with assist prn    [x]Home with continuous supervision       []Home with continuous assistance   [] Assisted living                     []SNF   Hola Robledo pt, ot    CURRENT EQUIPMENT NEEDS:  [x]Handicap Placard Application    Equipment needed at discharge: Rolling walker    ADL Equipment:Shower chair and 3 in 1 BSC    Plan/Adjustments to Plan   [x]Medical conditions exist that require a minimum of 3 times/week physician      oversight and 24-hour rehabilitation nursing to manage/progress the plan of care   [x]Functional deficits require intensive and coordinated therapies to achieve   goals outlined in plan of care    [x]3 hours therapy 5 days/week OR 15 hours therapy over 7 days    [x]Continued plan of care as patient is showing progress and /or has an expectation to benefit    Patient's plan of care has been reviewed and/or adjusted. Continue treatment as outlined.    I have led this Team Conference and agree with the plan, Miquel Poole MD, 12/12/2023, 2:17 PM  Physician signature:      Date/time:    Team Conference

## 2023-12-12 NOTE — PLAN OF CARE
Problem: Occupational Therapy - Adult  Goal: By Discharge: Performs self-care activities at highest level of function for planned discharge setting. See evaluation for individualized goals. Description: Occupational Therapy Goals   Long Term Goals  Initiated (12/2/2023) and to be accomplished within 4 week(s)  1. Pt will perform self-feeding with Mod I.  2. Pt will perform grooming with set-up. 3. Pt will perform UB bathing with supv. 4. Pt will perform LB bathing with CGA using AE and/ or compensatory strategies as needed. 5. Pt will perform tub/shower transfer with CGA using least restrictive assistive device. 6. Pt will perform UB dressing with SBA using rohini technique. 7. Pt will perform LB dressing with CGA using AE and/ or compensatory strategies as needed. 8. Pt will perform toileting task with CGA. 9. Pt will perform toilet transfer with CGA using least restrictive assistive device. Short Term Goals   Initiated (12/2/2023) and to be accomplished within 7 day(s), (Updated 12/7/2023; to be reassessed 12/14/2023)  1. Pt will perform self-feeding with supv. ( 12/7/2023)  2. Pt will perform grooming with CGA. ( 12/7/2023, currently sup)  3. Pt will perform UB bathing with Min A. ( 12/7/2023, currently sup)  4. Pt will perform LB bathing with Mod A using AE and/ or compensatory strategies as needed. ( 12/7/2023, currently CGA)  5. Pt will perform tub/shower transfer with Mod A using least restrictive assistive device. ( 12/7/2023, currently Min A)  6. Pt will perform UB dressing with Min A using rohini technique. ( 12/7/2023, currently supervision)  7. Pt will perform LB dressing with Mod A using AE and/ or compensatory strategies as needed. ( 12/7/2023)  8. Pt will perform toileting task with Mod A. ( 12/7/2023)  9. Pt will perform toilet transfer with Mod A using least restrictive assistive device.  ( 12/7/2023, currently Min A)     Outcome: Progressing   Occupational Therapy address decreased (I) with ADL's, left side weakness, left side inattention, impaired LUE ROM, impaired LUE FMC, decreased strength/ endurance, impaired balance/ coordination, safety, and impaired functional transfers/ mobility which impact pt performance, independence and safety with ADL/IADL's and functional mobility for return to prior level of functioning. Progression toward goals:  []          Improving appropriately and progressing toward goals  [x]          Improving slowly and progressing toward goals  []          Not making progress toward goals and plan of care will be adjusted       PLAN:  Patient continues to benefit from skilled intervention to address the above impairments. Continue treatment per established plan of care. Discharge Recommendations:  Home Health occupational therapy with 24 hr assist  Further Equipment Recommendations for Discharge:  shower chair; 3-in-1 commode   Estimated LOS: 12/20/2023     COMMUNICATION/EDUCATION:   [] Home safety education was provided and the patient/caregiver indicated understanding. [x] Patient/family have participated as able in goal setting and plan of care. [x] Patient/family agree to work toward stated goals and plan of care. [] Patient understands intent and goals of therapy, but is neutral about his/her participation. [] Patient is unable to participate in goal setting and plan of care. Please refer to the flowsheet for vital signs taken during this treatment.   After treatment:   [x]  Patient left in no apparent distress sitting up in wheelchair with needs met  []  Patient left in no apparent distress in bed  []  Patient handoff to SLP/PT  [x]  Call bell and immediate needs left within reach  [x]  Nursing notified  []  Caregiver present  [x]  Wheelchair alarm activated    Entered Differentiated Treatment minutes: yes    Garrett Miller OT  12/12/2023

## 2023-12-13 PROCEDURE — 97112 NEUROMUSCULAR REEDUCATION: CPT

## 2023-12-13 PROCEDURE — 97116 GAIT TRAINING THERAPY: CPT

## 2023-12-13 PROCEDURE — 99232 SBSQ HOSP IP/OBS MODERATE 35: CPT | Performed by: EMERGENCY MEDICINE

## 2023-12-13 PROCEDURE — 97110 THERAPEUTIC EXERCISES: CPT

## 2023-12-13 PROCEDURE — 1180000000 HC REHAB R&B

## 2023-12-13 PROCEDURE — 97535 SELF CARE MNGMENT TRAINING: CPT

## 2023-12-13 PROCEDURE — 97530 THERAPEUTIC ACTIVITIES: CPT

## 2023-12-13 PROCEDURE — 6370000000 HC RX 637 (ALT 250 FOR IP): Performed by: EMERGENCY MEDICINE

## 2023-12-13 RX ADMIN — LOSARTAN POTASSIUM 50 MG: 50 TABLET, FILM COATED ORAL at 08:50

## 2023-12-13 RX ADMIN — AMLODIPINE BESYLATE 10 MG: 10 TABLET ORAL at 08:50

## 2023-12-13 RX ADMIN — LEVETIRACETAM 500 MG: 500 TABLET, FILM COATED ORAL at 20:29

## 2023-12-13 RX ADMIN — LEVETIRACETAM 500 MG: 500 TABLET, FILM COATED ORAL at 08:50

## 2023-12-13 RX ADMIN — LEVOTHYROXINE SODIUM 88 MCG: 0.05 TABLET ORAL at 06:12

## 2023-12-13 RX ADMIN — DOCUSATE SODIUM 100 MG: 100 CAPSULE, LIQUID FILLED ORAL at 08:50

## 2023-12-13 RX ADMIN — DOCUSATE SODIUM 100 MG: 100 CAPSULE, LIQUID FILLED ORAL at 20:30

## 2023-12-13 ASSESSMENT — PAIN SCALES - WONG BAKER
WONGBAKER_NUMERICALRESPONSE: 0

## 2023-12-13 ASSESSMENT — PAIN SCALES - GENERAL
PAINLEVEL_OUTOF10: 0

## 2023-12-13 NOTE — PLAN OF CARE
Problem: Physical Therapy - Adult  Goal: By Discharge: Performs mobility at highest level of function for planned discharge setting.  See evaluation for individualized goals.  Description: Physical Therapy Short Term Goals  Initiated 12/2/2023 and to be accomplished within 7 day(s) [12/9/23]  1.  Patient will roll right with CGA and use of rail and roll left with modified independence with bed rail. (Goal Met 12/11/23)       Update:  Patient will roll right with modified [place] and use of rail. To be reassessed 12/16/23  2.  Patient will perform supine to sit and sit to supine in bed with minimal assistance with bed rail. (Goal Met 12/11/23)       Update: Patient will perform supine to sit and sit to supine in bed with SBA with bed rail. To be reassessed 12/16/23  3.  Patient will transfer from bed to chair and chair to bed with minimal assistance using the least restrictive device. Goal Met 12/11/23)       Update: Patient will transfer from bed to chair and chair to bed with SBA using the least restrictive device. To be reassessed 12/16/23  4.  Patient will perform sit to stand with minimal assistance. (Goal Met 12/11/23)       Update: Patient will perform sit to stand with Supervision. To be reassessed 12/16/23  5.  Patient will ambulate with minimal assistance for 50 feet with the least restrictive device. (Goal Met 12/11/23)       Update: Patient will ambulate with supervision for 200 feet with the least restrictive device.     6.  Patient will propel w/c 150 ft with rohini technique using R UE and R Le and supervision for mobility on unit. (Goal Met 12/11/23)    Physical Therapy Long Term Goals  Initiated 12/2/2023 and to be accomplished within 28 day(s) [12/30/23]  1.  Patient will supine to sit, sit to supine, roll right, and roll left in bed with modified independence.    2.  Patient will transfer from bed to chair and chair to bed with modified independence using the least restrictive device.  3.   Patient will perform sit to stand with modified independence. 4.  Patient will ambulate with supervision for 150 feet with the least restrictive device. 5.  Patient will ascend/descend 4 stairs with right handrail(s) with contact guard assist.  Outcome: Progressing     PHYSICAL THERAPY TREATMENT    Patient: Evan Solorzano (71 y.o. female)  Date: 2023  Diagnosis: Intracranial hemorrhage (HCC) [I62.9]   Precautions: fall risk  Chart, physical therapy assessment, plan of care and goals were reviewed. Time in:  1600  Time out :  1700    Patient seen for: gait and transfer training, and strengthening    Pain:  Pt pain was reported as 0 pre-treatment. Pt pain was reported as 0 post-treatment. Patient identified with name and : Yes    SUBJECTIVE:      Patient reports she is not sleeping well, wakes up a lot. OBJECTIVE DATA SUMMARY:    Objective:   Education: Education Given To: Patient  Education Provided: ADL Function; Energy Conservation;Transfer Training;Mobility Training  Education Method: Demonstration;Verbal  Barriers to Learning: None  Education Outcome: Verbalized understanding;Demonstrated understanding;Continued education needed  BED/MAT MOBILITY Daily Assessment    Rolling Right Stand by assistance;Contact guard assistance    Rolling Left Modified independent    Supine to Sit Contact guard assistance (with verbal cues for log roll technique and sequencing)    Sit to Supine Stand by assistance;Supervision      TRANSFERS Daily Assessment    Sit to Stand Stand by assistance    Transfer Assist Score Contact guard assistance;Stand by assistance (using stand step technique with verbal cues for sequencing without AD)             Comments    Verbal cueing required for hand placement and sequencing and safe execution of transfer.     Car Transfer  N/a    Car Type N/a        GAIT Daily Assessment    Gait Deviations Decreased step height (no noted hyperextension during stance phase of gait) present  [] Bed alarm activated   [x] Chair alarm activated        Lolita Saul, PTA  12/13/2023

## 2023-12-13 NOTE — PLAN OF CARE
Problem: Occupational Therapy - Adult  Goal: By Discharge: Performs self-care activities at highest level of function for planned discharge setting. See evaluation for individualized goals. Description: Occupational Therapy Goals   Long Term Goals  Initiated (12/2/2023) and to be accomplished within 4 week(s)  1. Pt will perform self-feeding with Mod I.  2. Pt will perform grooming with set-up. 3. Pt will perform UB bathing with supv. 4. Pt will perform LB bathing with CGA using AE and/ or compensatory strategies as needed. 5. Pt will perform tub/shower transfer with CGA using least restrictive assistive device. 6. Pt will perform UB dressing with SBA using rohini technique. 7. Pt will perform LB dressing with CGA using AE and/ or compensatory strategies as needed. 8. Pt will perform toileting task with CGA. 9. Pt will perform toilet transfer with CGA using least restrictive assistive device. Short Term Goals   Initiated (12/2/2023) and to be accomplished within 7 day(s), (Updated 12/7/2023; to be reassessed 12/14/2023)  1. Pt will perform self-feeding with supv. ( 12/7/2023)  2. Pt will perform grooming with CGA. ( 12/7/2023, currently sup)  3. Pt will perform UB bathing with Min A. ( 12/7/2023, currently sup)  4. Pt will perform LB bathing with Mod A using AE and/ or compensatory strategies as needed. ( 12/7/2023, currently CGA)  5. Pt will perform tub/shower transfer with Mod A using least restrictive assistive device. ( 12/7/2023, currently Min A)  6. Pt will perform UB dressing with Min A using rohini technique. ( 12/7/2023, currently supervision)  7. Pt will perform LB dressing with Mod A using AE and/ or compensatory strategies as needed. ( 12/7/2023)  8. Pt will perform toileting task with Mod A. ( 12/7/2023)  9. Pt will perform toilet transfer with Mod A using least restrictive assistive device.  ( 12/7/2023, currently Min A)     Outcome: Progressing   Occupational Therapy increasing pt's functional strength/ endurance and balance for carryover to ADL's/ IADL's. THERAPEUTIC EXERCISE Daily Assessment    RUE/ LUE there ex performed using arm bike (power ) light resistance 15 min duration for range of motion, strength, and endurance for increased functional independence with ADL's. Verbal cues for maintaining steady pace. Rest break x1 due to fatigue. Left hand there ex performed using graded clothespin (green; moderate resistance) during tabletop item retrieval task for reaching across midline to pick small items (pieces of pink foam) and to place them into a container positioned at differing distances. Therapist providing initial instruction with demonstration for alternating pinch patterns (e.g. tip pinch; three jaw ko, and lateral key pinch). Performed for pinch strength, eye hand coordination, and functional reach for carryover to increased functional independence with ADL's. Intermittent rest breaks due to hand fatigue. Neuro re-education Daily Assessment    Left hand pink foam block squeezes performed; multiple repetitions for  squeezes and pinches. Therapist providing initial instruction with demonstration for hand positioning and technique. Performed for  strength, pinch strength, and for proprioceptive input/ awareness of pt's affected L UE.      GROOMING Daily Assessment   Functional Level Supervision   Clinical factors  Patient completed hand hygiene (supv/ set-up) FWW at sink following toileting. Pt completed oral hygiene (set-up/ supv) standing at sink with grooming items set-up at sink side. TOILETING Daily Assessment   Functional Level Contact guard assistance   Clinical factors Toileting performed (CGA) for clothing managment; SBA for hygiene. Pt voided at this time.      TOILET TRANSFER Daily Assessment   Transfer Technique Stand step   Level of Assistance Stand by assistance;Contact guard assistance   Equipment Raised toilet seat with

## 2023-12-13 NOTE — PROGRESS NOTES
TEAM CONFERENCE FOLLOW-UP  Patient: Grace Marshall (19 y.o. female)  Date: 12/13/2023  Diagnosis: Intracranial hemorrhage (HCC) [I62.9] Intracranial hemorrhage ([address])      Precautions:      Met with patient to discuss the findings from 12/13/2023 Team Conference.     Patient requested further information and/or had questions:   Myra Young

## 2023-12-13 NOTE — PLAN OF CARE
Problem: Safety - Adult  Goal: Free from fall injury  Outcome: Progressing     Problem: Respiratory - Adult  Goal: Achieves optimal ventilation and oxygenation  Outcome: Progressing     Problem: Cardiovascular - Adult  Goal: Maintains optimal cardiac output and hemodynamic stability  Outcome: Progressing  Goal: Absence of cardiac dysrhythmias or at baseline  Outcome: Progressing     Problem: Skin/Tissue Integrity - Adult  Goal: Skin integrity remains intact  Outcome: Progressing     Problem: Musculoskeletal - Adult  Goal: Return mobility to safest level of function  Outcome: Progressing  Goal: Maintain proper alignment of affected body part  Outcome: Progressing  Goal: Return ADL status to a safe level of function  Outcome: Progressing     Problem: Gastrointestinal - Adult  Goal: Maintains adequate nutritional intake  Outcome: Progressing     Problem: Genitourinary - Adult  Goal: Absence of urinary retention  Outcome: Progressing     Problem: Infection - Adult  Goal: Absence of infection at discharge  Outcome: Progressing  Goal: Absence of infection during hospitalization  Outcome: Progressing     Problem: Occupational Therapy - Adult  Goal: By Discharge: Performs self-care activities at highest level of function for planned discharge setting. See evaluation for individualized goals. Description: Occupational Therapy Goals   Long Term Goals  Initiated (12/2/2023) and to be accomplished within 4 week(s)  1. Pt will perform self-feeding with Mod I.  2. Pt will perform grooming with set-up. 3. Pt will perform UB bathing with supv. 4. Pt will perform LB bathing with CGA using AE and/ or compensatory strategies as needed. 5. Pt will perform tub/shower transfer with CGA using least restrictive assistive device. 6. Pt will perform UB dressing with SBA using rohini technique. 7. Pt will perform LB dressing with CGA using AE and/ or compensatory strategies as needed. 8. Pt will perform toileting task with CGA.   9.

## 2023-12-13 NOTE — PLAN OF CARE
Problem: Occupational Therapy - Adult  Goal: By Discharge: Performs self-care activities at highest level of function for planned discharge setting. See evaluation for individualized goals. Description: Occupational Therapy Goals   Long Term Goals  Initiated (12/2/2023) and to be accomplished within 4 week(s)  1. Pt will perform self-feeding with Mod I.  2. Pt will perform grooming with set-up. 3. Pt will perform UB bathing with supv. 4. Pt will perform LB bathing with CGA using AE and/ or compensatory strategies as needed. 5. Pt will perform tub/shower transfer with CGA using least restrictive assistive device. 6. Pt will perform UB dressing with SBA using rohini technique. 7. Pt will perform LB dressing with CGA using AE and/ or compensatory strategies as needed. 8. Pt will perform toileting task with CGA. 9. Pt will perform toilet transfer with CGA using least restrictive assistive device. Short Term Goals   Initiated (12/2/2023) and to be accomplished within 7 day(s), (Updated 12/7/2023; to be reassessed 12/14/2023)  1. Pt will perform self-feeding with supv. ( 12/7/2023)  2. Pt will perform grooming with CGA. ( 12/7/2023, currently sup)  3. Pt will perform UB bathing with Min A. ( 12/7/2023, currently sup)  4. Pt will perform LB bathing with Mod A using AE and/ or compensatory strategies as needed. ( 12/7/2023, currently CGA)  5. Pt will perform tub/shower transfer with Mod A using least restrictive assistive device. ( 12/7/2023, currently Min A)  6. Pt will perform UB dressing with Min A using rohini technique. ( 12/7/2023, currently supervision)  7. Pt will perform LB dressing with Mod A using AE and/ or compensatory strategies as needed. ( 12/7/2023)  8. Pt will perform toileting task with Mod A. ( 12/7/2023)  9. Pt will perform toilet transfer with Mod A using least restrictive assistive device.  ( 12/7/2023, currently Min A)     12/13/2023 1614 by Isreal Navarro

## 2023-12-14 PROCEDURE — 97530 THERAPEUTIC ACTIVITIES: CPT

## 2023-12-14 PROCEDURE — 97116 GAIT TRAINING THERAPY: CPT

## 2023-12-14 PROCEDURE — 6370000000 HC RX 637 (ALT 250 FOR IP): Performed by: EMERGENCY MEDICINE

## 2023-12-14 PROCEDURE — 1180000000 HC REHAB R&B

## 2023-12-14 PROCEDURE — 97535 SELF CARE MNGMENT TRAINING: CPT

## 2023-12-14 PROCEDURE — 97150 GROUP THERAPEUTIC PROCEDURES: CPT

## 2023-12-14 PROCEDURE — 97110 THERAPEUTIC EXERCISES: CPT

## 2023-12-14 PROCEDURE — 99232 SBSQ HOSP IP/OBS MODERATE 35: CPT | Performed by: EMERGENCY MEDICINE

## 2023-12-14 RX ADMIN — DOCUSATE SODIUM 100 MG: 100 CAPSULE, LIQUID FILLED ORAL at 21:08

## 2023-12-14 RX ADMIN — AMLODIPINE BESYLATE 10 MG: 10 TABLET ORAL at 07:55

## 2023-12-14 RX ADMIN — LEVETIRACETAM 500 MG: 500 TABLET, FILM COATED ORAL at 07:55

## 2023-12-14 RX ADMIN — LEVOTHYROXINE SODIUM 88 MCG: 0.05 TABLET ORAL at 06:18

## 2023-12-14 RX ADMIN — DOCUSATE SODIUM 100 MG: 100 CAPSULE, LIQUID FILLED ORAL at 07:55

## 2023-12-14 RX ADMIN — LEVETIRACETAM 500 MG: 500 TABLET, FILM COATED ORAL at 21:08

## 2023-12-14 RX ADMIN — LOSARTAN POTASSIUM 50 MG: 50 TABLET, FILM COATED ORAL at 07:55

## 2023-12-14 ASSESSMENT — PAIN SCALES - GENERAL
PAINLEVEL_OUTOF10: 0

## 2023-12-14 ASSESSMENT — PAIN SCALES - WONG BAKER
WONGBAKER_NUMERICALRESPONSE: 0

## 2023-12-14 NOTE — PLAN OF CARE
Problem: Physical Therapy - Adult  Goal: By Discharge: Performs mobility at highest level of function for planned discharge setting. See evaluation for individualized goals. Description: Physical Therapy Short Term Goals  Initiated 12/2/2023 and to be accomplished within 7 day(s) [12/9/23]  1. Patient will roll right with CGA and use of rail and roll left with modified independence with bed rail. (Goal Met 12/11/23)       Update:  Patient will roll right with modified [place] and use of rail. To be reassessed 12/16/23  2. Patient will perform supine to sit and sit to supine in bed with minimal assistance with bed rail. (Goal Met 12/11/23)       Update: Patient will perform supine to sit and sit to supine in bed with SBA with bed rail. To be reassessed 12/16/23  3. Patient will transfer from bed to chair and chair to bed with minimal assistance using the least restrictive device. Goal Met 12/11/23)       Update: Patient will transfer from bed to chair and chair to bed with SBA using the least restrictive device. To be reassessed 12/16/23  4. Patient will perform sit to stand with minimal assistance. (Goal Met 12/11/23)       Update: Patient will perform sit to stand with Supervision. To be reassessed 12/16/23  5. Patient will ambulate with minimal assistance for 50 feet with the least restrictive device. (Goal Met 12/11/23)       Update: Patient will ambulate with supervision for 200 feet with the least restrictive device. 6.  Patient will propel w/c 150 ft with rohini technique using R UE and R Le and supervision for mobility on unit. (Goal Met 12/11/23)    Physical Therapy Long Term Goals  Initiated 12/2/2023 and to be accomplished within 28 day(s) [12/30/23]  1. Patient will supine to sit, sit to supine, roll right, and roll left in bed with modified independence. 2.  Patient will transfer from bed to chair and chair to bed with modified independence using the least restrictive device.   3.

## 2023-12-14 NOTE — PLAN OF CARE
Problem: Occupational Therapy - Adult  Goal: By Discharge: Performs self-care activities at highest level of function for planned discharge setting. See evaluation for individualized goals. Description: Occupational Therapy Goals   Long Term Goals  Initiated (12/2/2023) and to be accomplished within 4 week(s)  1. Pt will perform self-feeding with Mod I.  2. Pt will perform grooming with set-up. 3. Pt will perform UB bathing with supv. 4. Pt will perform LB bathing with CGA using AE and/ or compensatory strategies as needed. 5. Pt will perform tub/shower transfer with CGA using least restrictive assistive device. 6. Pt will perform UB dressing with SBA using rohini technique. 7. Pt will perform LB dressing with CGA using AE and/ or compensatory strategies as needed. 8. Pt will perform toileting task with CGA. 9. Pt will perform toilet transfer with CGA using least restrictive assistive device. Short Term Goals   Initiated (12/2/2023) and to be accomplished within 7 day(s), (Updated 12/7/2023; to be reassessed 12/14/2023)  1. Pt will perform self-feeding with supv. ( 12/7/2023)  2. Pt will perform grooming with CGA. ( 12/7/2023, currently sup)  3. Pt will perform UB bathing with Min A. ( 12/7/2023, currently sup)  4. Pt will perform LB bathing with Mod A using AE and/ or compensatory strategies as needed. ( 12/7/2023, currently CGA)  5. Pt will perform tub/shower transfer with Mod A using least restrictive assistive device. ( 12/7/2023, currently Min A)  6. Pt will perform UB dressing with Min A using rohini technique. ( 12/7/2023, currently supervision)  7. Pt will perform LB dressing with Mod A using AE and/ or compensatory strategies as needed. ( 12/7/2023)  8. Pt will perform toileting task with Mod A. ( 12/7/2023)  9. Pt will perform toilet transfer with Mod A using least restrictive assistive device.  ( 12/7/2023, currently Min A)     Outcome: Progressing   Occupational Therapy TREATMENT    Patient: Shawn Olivera   53 y.o.    Patient identified with name and : Yes    Date: 2023    First Tx Session  Time In: 1130  Time Out: 1230    Second Tx Session  Time In: 1500  Time Out: 1530    Diagnosis: Intracranial hemorrhage (HCC) [I62.9]   Precautions: Restrictions/Precautions: Aspiration Risk, Seizure, Fall Risk         Chart, occupational therapy assessment, plan of care, and goals were reviewed.     Pain:  Pt reports 0/10 pain or discomfort prior to treatment.    Pt reports 0/10 pain or discomfort post treatment.   Intervention Provided: NA      SUBJECTIVE:   Patient stated “There are a few things I really enjoy baking.”    OBJECTIVE DATA SUMMARY:     THERAPEUTIC EXERCISE Daily Assessment    Pt performed left hand strengthening exercises for carryover to functional tasks. Pt used DigiFlex (red) set for individual digit presses for strengthening left hand. Pt performed exercise for ~10 minutes with several rest breaks due to increased fatigue.     IADL Daily Assessment   Meal prep  During first session, pt participated in kitchen activity to simulate when returning to home environment. Pt used muffin mix to follow directions on how to prepare and cook. Pt able to set oven correctly, locate items in kitchen, mix the muffins, and fill the muffin containers. Pt did become flustered at times if she made a mistake and had difficulty determining how to fix. Pt required extra time to perform activity.  During second session, pt washed dishes in sink and placed in drying rack. Pt required verbal cues on proper positioning of FWW throughout activity.   Money management     Medication Management       FUNCTIONAL MOBILITY Daily Assessment           Comments  Pt walked from gym to room using FWW and gait belt with SBA/CGA. Pt self propelled w/c room to gym using BLE to propel forward.        WHEELCHAIR/BED TRANSFER INDEPENDENCE Daily Assessment   Transfer Technique  Stand-step   Level of Assistance  of therapy, but is neutral about his/her participation. [] Patient is unable to participate in goal setting and plan of care. Please refer to the flowsheet for vital signs taken during this treatment.   After treatment:   [x]  Patient left in no apparent distress sitting up in chair   []  Patient left in no apparent distress in bed  [x]  Call bell left within reach  []  Nursing notified  []  Caregiver present  []  Bed alarm activated      Estimated LOS:12/20/23  Entered Differentiated Treatment minutes: Yes    Rene Self, MASTERSON/L

## 2023-12-14 NOTE — PROGRESS NOTES
Comprehensive Nutrition Assessment    Type and Reason for Visit:  Initial, RD Nutrition Re-Screen/LOS    Nutrition Recommendations/Plan:   No nutrition intervention indicated at this time. Will re-screen as appropriate. Malnutrition Assessment:  Malnutrition Status:  No malnutrition (pt reported good appetite/ po intake (amount is typical for her); stated wt fluctuations is the norm for her) (12/14/23 1808)      Nutrition History and Allergies:   Past medical hx:  hypothyroidism. Wt trends per chart hx: 175 lb on 11/17/23,  156  lb on 12/1/23. -19 lb, 10.9% x 2 weeks PTA. Pt reported UBW ranges from 150- 175 lb due to her thyroid. She reported that she would prefer to stay at the weight on lower end of range. Currently weighing 156 lb. Reported having good appetite/ po intake PTA to acute care hospital and PTA to ARU. No known food allergies     Nutrition Assessment:    Pt admitted to ARU for therapy due to impaired mobility and ADLs secondary to left-sided weakness, left-sided weakness due to acute hemorrhagic right parietal lobe and small subarachnoid hemorrhage. Is on po diet; has fair/good meal intake per chart documentation. Tolerating diet pt reported having good appetite/ po intake of meals; stated amount consuming is typical for her even PTA. Pt reported that her weight usually fluctuates due to her thyroid. Is currently at lower end of her typical wt range. Discussed adding nutrition supplement; pt declined, stating that she is satisfied after meal intake. Pt denied having any nutrition concerns regarding her wt or po intake. Nutrition Related Findings:    BM 12/13. No edema. BMP last checked on 12/12; Na, K, Ca - all WNL. Pertinent meds: bowel regimen, keppra, synthroid, cozaar. Wound Type: None       Current Nutrition Intake & Therapies:    Average Meal Intake: 51-75%, %  Average Supplements Intake: None Ordered  ADULT DIET;  Regular; Low Fat/Low Chol/High Fiber/2 gm Na    Anthropometric Measures:  Height: 160 cm (5' 3\")  Ideal Body Weight (IBW): 115 lbs (52 kg)    Admission Body Weight: 71.2 kg (156 lb 13.8 oz)  Current Body Weight: 71.2 kg (156 lb 13.8 oz), 136.4 % IBW. Weight Source: Bed Scale  Current BMI (kg/m2): 27.8  Usual Body Weight: 79.4 kg (175 lb) (150- 175 lb is typical for her)  % Weight Change (Calculated): -10.4  Weight Adjustment For: No Adjustment  BMI Categories: Overweight (BMI 25.0-29. 9)    Estimated Daily Nutrient Needs:  Energy Requirements Based On: Formula (MSJ x1-1.3)  Weight Used for Energy Requirements: Admission  Energy (kcal/day): 1612-3250  Weight Used for Protein Requirements: Admission  Protein (g/day): 71-85 (wt x1-1.2)  Method Used for Fluid Requirements: 1 ml/kcal  Fluid (ml/day): 2657-2705    Nutrition Diagnosis:   No nutrition diagnosis at this time      Nutrition Interventions:   Food and/or Nutrient Delivery: Continue Current Diet  Nutrition Education/Counseling: No recommendation at this time  Coordination of Nutrition Care: Continue to monitor while inpatient  Plan of Care discussed with: pt    Goals:     Goals: Meet at least 75% of estimated needs, by next RD assessment       Nutrition Monitoring and Evaluation:   Behavioral-Environmental Outcomes: None Identified  Food/Nutrient Intake Outcomes: Diet Advancement/Tolerance, Food and Nutrient Intake  Physical Signs/Symptoms Outcomes: Biochemical Data, GI Status, Nutrition Focused Physical Findings, Weight, Meal Time Behavior    Discharge Planning:    Continue current diet     Darren Mahoney, [id number] [place] [address]  Contact: [phone]

## 2023-12-14 NOTE — PLAN OF CARE
Problem: Safety - Adult  Goal: Free from fall injury  Outcome: Progressing     Problem: Respiratory - Adult  Goal: Achieves optimal ventilation and oxygenation  Outcome: Progressing     Problem: Cardiovascular - Adult  Goal: Maintains optimal cardiac output and hemodynamic stability  Outcome: Progressing  Goal: Absence of cardiac dysrhythmias or at baseline  Outcome: Progressing     Problem: Skin/Tissue Integrity - Adult  Goal: Skin integrity remains intact  Outcome: Progressing     Problem: Musculoskeletal - Adult  Goal: Return mobility to safest level of function  Outcome: Progressing  Goal: Maintain proper alignment of affected body part  Outcome: Progressing  Goal: Return ADL status to a safe level of function  Outcome: Progressing     Problem: Gastrointestinal - Adult  Goal: Maintains adequate nutritional intake  Outcome: Progressing     Problem: Genitourinary - Adult  Goal: Absence of urinary retention  Outcome: Progressing     Problem: Infection - Adult  Goal: Absence of infection at discharge  Outcome: Progressing  Goal: Absence of infection during hospitalization  Outcome: Progressing     Problem: Occupational Therapy - Adult  Goal: By Discharge: Performs self-care activities at highest level of function for planned discharge setting. See evaluation for individualized goals. Description: Occupational Therapy Goals   Long Term Goals  Initiated (12/2/2023) and to be accomplished within 4 week(s)  1. Pt will perform self-feeding with Mod I.  2. Pt will perform grooming with set-up. 3. Pt will perform UB bathing with supv. 4. Pt will perform LB bathing with CGA using AE and/ or compensatory strategies as needed. 5. Pt will perform tub/shower transfer with CGA using least restrictive assistive device. 6. Pt will perform UB dressing with SBA using rohini technique. 7. Pt will perform LB dressing with CGA using AE and/ or compensatory strategies as needed. 8. Pt will perform toileting task with CGA.   9. to chair and chair to bed with minimal assistance using the least restrictive device. Goal Met 12/11/23)       Update: Patient will transfer from bed to chair and chair to bed with SBA using the least restrictive device. To be reassessed 12/16/23  4.  Patient will perform sit to stand with minimal assistance. (Goal Met 12/11/23)       Update: Patient will perform sit to stand with Supervision. To be reassessed 12/16/23  5.  Patient will ambulate with minimal assistance for 50 feet with the least restrictive device. (Goal Met 12/11/23)       Update: Patient will ambulate with supervision for 200 feet with the least restrictive device.     6.  Patient will propel w/c 150 ft with rohini technique using R UE and R Le and supervision for mobility on unit. (Goal Met 12/11/23)    Physical Therapy Long Term Goals  Initiated 12/2/2023 and to be accomplished within 28 day(s) [12/30/23]  1.  Patient will supine to sit, sit to supine, roll right, and roll left in bed with modified independence.    2.  Patient will transfer from bed to chair and chair to bed with modified independence using the least restrictive device.  3.  Patient will perform sit to stand with modified independence.  4.  Patient will ambulate with supervision for 150 feet with the least restrictive device.   5.  Patient will ascend/descend 4 stairs with right handrail(s) with contact guard assist.  12/13/2023 1624 by Steffanie Saeed, TY  Outcome: Progressing     Problem: Pain  Goal: Verbalizes/displays adequate comfort level or baseline comfort level  Outcome: Progressing     Problem: Skin/Tissue Integrity  Goal: Absence of new skin breakdown  Description: 1.  Monitor for areas of redness and/or skin breakdown  2.  Assess vascular access sites hourly  3.  Every 4-6 hours minimum:  Change oxygen saturation probe site  4.  Every 4-6 hours:  If on nasal continuous positive airway pressure, respiratory therapy assess nares and determine need for appliance change

## 2023-12-15 PROCEDURE — 1180000000 HC REHAB R&B

## 2023-12-15 PROCEDURE — 97150 GROUP THERAPEUTIC PROCEDURES: CPT

## 2023-12-15 PROCEDURE — 6370000000 HC RX 637 (ALT 250 FOR IP): Performed by: EMERGENCY MEDICINE

## 2023-12-15 PROCEDURE — 99232 SBSQ HOSP IP/OBS MODERATE 35: CPT | Performed by: EMERGENCY MEDICINE

## 2023-12-15 RX ADMIN — AMLODIPINE BESYLATE 10 MG: 10 TABLET ORAL at 08:33

## 2023-12-15 RX ADMIN — LEVETIRACETAM 500 MG: 500 TABLET, FILM COATED ORAL at 20:45

## 2023-12-15 RX ADMIN — LEVETIRACETAM 500 MG: 500 TABLET, FILM COATED ORAL at 08:33

## 2023-12-15 RX ADMIN — LOSARTAN POTASSIUM 50 MG: 50 TABLET, FILM COATED ORAL at 08:33

## 2023-12-15 RX ADMIN — DOCUSATE SODIUM 100 MG: 100 CAPSULE, LIQUID FILLED ORAL at 20:45

## 2023-12-15 RX ADMIN — DOCUSATE SODIUM 100 MG: 100 CAPSULE, LIQUID FILLED ORAL at 08:33

## 2023-12-15 RX ADMIN — LEVOTHYROXINE SODIUM 88 MCG: 0.05 TABLET ORAL at 06:31

## 2023-12-15 ASSESSMENT — PAIN SCALES - GENERAL
PAINLEVEL_OUTOF10: 0

## 2023-12-15 ASSESSMENT — PAIN SCALES - WONG BAKER
WONGBAKER_NUMERICALRESPONSE: 0
WONGBAKER_NUMERICALRESPONSE: 0

## 2023-12-15 NOTE — PROGRESS NOTES
Sw previously discussed dc planning with pt and offered [facility] for home care. Pt selected [address] [facility]. Sw placed a referral and left a message for liaisons. Pt states that she will need a rolling walker. Order entered via [facility]. Pt aware of need to privately arrange bedside commode and shower chair. Pt affirms that her mother will be her caregiver at dc. Education will be coordinated. Sw will follow.

## 2023-12-15 NOTE — PLAN OF CARE
Problem: Physical Therapy - Adult  Goal: By Discharge: Performs mobility at highest level of function for planned discharge setting. See evaluation for individualized goals. Description: Physical Therapy Short Term Goals  Initiated 12/2/2023 and to be accomplished within 7 day(s) [12/9/23]  1. Patient will roll right with CGA and use of rail and roll left with modified independence with bed rail. (Goal Met 12/11/23)       Update:  Patient will roll right with modified [place] and use of rail. To be reassessed 12/16/23  2. Patient will perform supine to sit and sit to supine in bed with minimal assistance with bed rail. (Goal Met 12/11/23)       Update: Patient will perform supine to sit and sit to supine in bed with SBA with bed rail. To be reassessed 12/16/23  3. Patient will transfer from bed to chair and chair to bed with minimal assistance using the least restrictive device. Goal Met 12/11/23)       Update: Patient will transfer from bed to chair and chair to bed with SBA using the least restrictive device. To be reassessed 12/16/23  4. Patient will perform sit to stand with minimal assistance. (Goal Met 12/11/23)       Update: Patient will perform sit to stand with Supervision. To be reassessed 12/16/23  5. Patient will ambulate with minimal assistance for 50 feet with the least restrictive device. (Goal Met 12/11/23)       Update: Patient will ambulate with supervision for 200 feet with the least restrictive device. 6.  Patient will propel w/c 150 ft with rohini technique using R UE and R Le and supervision for mobility on unit. (Goal Met 12/11/23)    Physical Therapy Long Term Goals  Initiated 12/2/2023 and to be accomplished within 28 day(s) [12/30/23]  1. Patient will supine to sit, sit to supine, roll right, and roll left in bed with modified independence. 2.  Patient will transfer from bed to chair and chair to bed with modified independence using the least restrictive device.   3. Patient will perform sit to stand with modified independence. 4.  Patient will ambulate with supervision for 150 feet with the least restrictive device. 5.  Patient will ascend/descend 4 stairs with right handrail(s) with contact guard assist.  Outcome: Progressing   PHYSICAL THERAPY GROUP THERAPY TREATMENT    Patient: Chantale Vela (21 y.o. female)  Date: 12/15/2023  Diagnosis: Intracranial hemorrhage (HCC) [I62.9]  Precautions:  falls      Time In: 1000  Time Out: 1030  Entered Differentiated Treatment minutes: yes    Patient seen for: Group therapy session (LE exercise and w/c mobility)    Pain:  Pt pain was reported as 0/10 pre-treatment. Pt pain was reported as 0/10 post-treatment. Intervention: none indicated    Patient identified with name and : yes    SUBJECTIVE:      Patient agreeable to group therapy session. OBJECTIVE DATA SUMMARY:    Objective:     Seated/Standing   EXERCISE   Sets   Reps   Active Active Assist   Comments   Ankle Pumps   [] []    Long Arc Quads 2 10 [x] [] 5# R ankle; 3# L ankle   Standing Marching 2 10 [x] [] RW support   Heel slides   [] []    Standing Hip Abd/Add 2 10 [x] [] RW support   Standing hamstring curls 2 10 [x] [] RW support   Hip Add Isometrics   [] []    Heel-toe raises on floor 2 10 [x] []    Wheelchair pushups   [] []      Wheelchair mobility: patient participated in w/c mobility with distant supervision from PT and occasional cues, using R UE and B LE's, for 217 ft      ASSESSMENT:  [x]      Patient participated fully in group therapy session and able to tolerate all activities  []      Patient able to participate in group therapy session with only minor modifications and/or extended rest breaks needed. []      Patient not able to tolerate group therapy session. PLAN:  Patient continues to benefit from skilled intervention to address functional impairments.   [x]   Patient appropriate to continue group therapy sessions to promote increased

## 2023-12-16 PROCEDURE — 97530 THERAPEUTIC ACTIVITIES: CPT

## 2023-12-16 PROCEDURE — 1180000000 HC REHAB R&B

## 2023-12-16 PROCEDURE — 97110 THERAPEUTIC EXERCISES: CPT

## 2023-12-16 PROCEDURE — 97535 SELF CARE MNGMENT TRAINING: CPT

## 2023-12-16 PROCEDURE — 97116 GAIT TRAINING THERAPY: CPT

## 2023-12-16 PROCEDURE — 6370000000 HC RX 637 (ALT 250 FOR IP): Performed by: EMERGENCY MEDICINE

## 2023-12-16 PROCEDURE — 97112 NEUROMUSCULAR REEDUCATION: CPT

## 2023-12-16 RX ADMIN — LEVOTHYROXINE SODIUM 88 MCG: 0.05 TABLET ORAL at 06:02

## 2023-12-16 RX ADMIN — DOCUSATE SODIUM 100 MG: 100 CAPSULE, LIQUID FILLED ORAL at 08:28

## 2023-12-16 RX ADMIN — LOSARTAN POTASSIUM 50 MG: 50 TABLET, FILM COATED ORAL at 08:28

## 2023-12-16 RX ADMIN — AMLODIPINE BESYLATE 10 MG: 10 TABLET ORAL at 08:28

## 2023-12-16 RX ADMIN — LEVETIRACETAM 500 MG: 500 TABLET, FILM COATED ORAL at 21:22

## 2023-12-16 RX ADMIN — DOCUSATE SODIUM 100 MG: 100 CAPSULE, LIQUID FILLED ORAL at 21:22

## 2023-12-16 RX ADMIN — LEVETIRACETAM 500 MG: 500 TABLET, FILM COATED ORAL at 08:28

## 2023-12-16 ASSESSMENT — PAIN SCALES - GENERAL
PAINLEVEL_OUTOF10: 0

## 2023-12-16 ASSESSMENT — PAIN SCALES - WONG BAKER
WONGBAKER_NUMERICALRESPONSE: 0

## 2023-12-16 NOTE — PLAN OF CARE
Patient will perform sit to stand with modified independence.  4.  Patient will ambulate with supervision for 150 feet with the least restrictive device.   5.  Patient will ascend/descend 4 stairs with right handrail(s) with contact guard assist.  Outcome: Progressing       PHYSICAL THERAPY TREATMENT    Patient: Shawn Olivera (53 y.o. female)  Date: 2023  Diagnosis: Intracranial hemorrhage (HCC) [I62.9]   Precautions: fall risk  Chart, physical therapy assessment, plan of care and goals were reviewed.    Time in: 930  Time out :     Patient seen for: there ex, NM, there act, transfers, balance, gait, Pt ed     Pain:  Pt pain was reported as 0/10 pre-treatment.  Pt pain was reported as 0/10 post-treatment.  Intervention: NA    Patient identified with name and : Yes    SUBJECTIVE:      Left leg still has sensation deficits at times    OBJECTIVE DATA SUMMARY:    Objective:   Education: Education Given To: Patient  Education Provided: Role of Therapy;ADL Function;Safety  Education Method: Demonstration;Verbal;Teach Back  Barriers to Learning: None  Education Outcome: Verbalized understanding;Continued education needed;Demonstrated understanding  BED/MAT MOBILITY Daily Assessment    Rolling Right Independent    Rolling Left Independent    Supine to Sit Independent    Sit to Supine Independent      TRANSFERS Daily Assessment    Sit to Stand Stand by assistance    Transfer Assist Score Stand by assistance;Contact guard assistance                 GAIT Daily Assessment    Gait Deviations      Assistive device Single point cane    Ambulation assistance - surface  Contact guard assistance  Level tile    Distance 80 ft    Comments Some hesitancy and symetry issues with cane placement    Ambulation-uneven surface  level tile  Supervision        STEPS/STAIRS Daily Assessment     Steps/Stairs ambulated 4  6\"    Assistance Required Stand by assistance;Contact guard assistance    Rail Use Bilateral    Comments  Difficulty stepping up with left LE; step to pattern on descent leading with left     Curbs/Ramps             BALANCE Daily Assessment    Posture Fair    Sitting - Static Good    Sitting - Dynamic Good    Standing - Static Fair;+    Standing - Dynamic Fair    Comments sway with either foot in front with tandem      WHEELCHAIR MOBILITY/MANAGEMENT Daily Assessment   Able to Propel     Assist Level     Curbs/ramps assistance required     Wheelchair management     Comments No diff with WC mobility, indep       EXERCISE/ACTIVITY   DETAIL   COMMENTS   Seated march x15     LAQ 2lb x 15     Sup bridge [id number]     Sup clam w/red [id number]     SLR flex x10     Tandem bal  [id number] Sway noted   AMB  150ft, 300ft, with RW SBA;  80 ft with SPC CGA/SBA assist;   80ft with no AD CGA/min assist           stairs 6in x4                                             ASSESSMENT:  Good carmen to activities. Noted coordination deficits with left LE with more difficluty tasks, amb with SPC or HHA or stairs; continued balance deficits as well  Progression toward goals:  [x]      Improving appropriately and progressing toward goals  []      Improving slowly and progressing toward goals  []      Not making progress toward goals and plan of care will be adjusted      PLAN:  Patient continues to benefit from skilled intervention to address the above impairments. Continue treatment per established plan of care. Discharge Recommendations:  Home with assist PRN;Home with Home health PT  Further Equipment Recommendations for Discharge:        Rolling walker (may progress to [facility] pending progress with PT)             Estimated Discharge Date:12/20/23    Activity Tolerance:   Good  Please refer to the flowsheet for vital signs taken during this treatment.     After treatment:   [x] Patient left in no apparent distress in bed  [] Patient left in no apparent distress sitting up in chair  [] Patient left in no apparent distress in w/c mobilizing under own

## 2023-12-16 NOTE — PROGRESS NOTES
Problem: Occupational Therapy - Adult  Goal: By Discharge: Performs self-care activities at highest level of function for planned discharge setting. See evaluation for individualized goals. Description: Occupational Therapy Goals   Long Term Goals  Initiated (12/2/2023) and to be accomplished within 4 week(s)  1. Pt will perform self-feeding with Mod I. ( 12/16 - Mod I)  2. Pt will perform grooming with set-up. ( 12/16 - Mod I)  3. Pt will perform UB bathing with supv. ( 12/16 - Sup)  4. Pt will perform LB bathing with CGA using AE and/ or compensatory strategies as needed. ( 12/16 - SBA)  5. Pt will perform tub/shower transfer with CGA using least restrictive assistive device. ( 12/16 - SBA)  6. Pt will perform UB dressing with SBA using rohini technique. ( 12/16 - s/u)  7. Pt will perform LB dressing with CGA using AE and/ or compensatory strategies as needed. ( 12/16 - SBA)  8. Pt will perform toileting task with CGA. ( 12/16 - Sup)  9. Pt will perform toilet transfer with CGA using least restrictive assistive device. ( 12/16 - Sup)    Short Term Goals   Initiated (12/2/2023) and to be accomplished within 7 day(s), (Updated 12/16)  1. Pt will perform self-feeding with supv. ( 12/7/2023)   2. Pt will perform grooming with CGA. ( 12/7/2023) (Mod I 12/16)  3. Pt will perform UB bathing with Min A. ( 12/7/2023, currently sup)  4. Pt will perform LB bathing with Mod A using AE and/ or compensatory strategies as needed. ( 12/7/2023) (SBA 12/16)  5. Pt will perform tub/shower transfer with Mod A using least restrictive assistive device. ( 12/7/2023) (SBA 12/16)  6. Pt will perform UB dressing with Min A using rohini technique. ( 12/7/2023, currently supervision) (s/u 12/16)  7. Pt will perform LB dressing with Mod A using AE and/ or compensatory strategies as needed. ( 12/7/2023) (SBA 12/16)  8. Pt will perform toileting task with Mod A. ( 12/7/2023) (Sup 12/16)  9.  Pt will perform toilet vital signs taken during this treatment. After treatment:   [x]  Patient left in no apparent distress sitting up in chair  []  Patient left in no apparent distress in bed  [x]  Call bell left within reach  []  Nursing notified  []  Caregiver present  []  W/C alarm activated    COMMUNICATION/EDUCATION:   [] Home safety education was provided and the patient/caregiver indicated understanding. [x] Patient/family have participated as able in goal setting and plan of care. [] Patient/family agree to work toward stated goals and plan of care. [] Patient understands intent and goals of therapy, but is neutral about his/her participation. [] Patient is unable to participate in goal setting and plan of care. Education: Education Given To: Patient  Education Provided: Role of Therapy; ADL Function; Safety  Education Method: Demonstration;Verbal;Teach Back  Barriers to Learning: None  Education Outcome: Verbalized understanding;Continued education needed;Demonstrated understanding         Plan of Care: Please see Care Plan for updated STG/LTGs. Estimated LOS: Projected D/C 12/20/23  Entered Differentiated Treatment minutes:  Yes    BELLO Juarez  12/16/2023

## 2023-12-17 VITALS
HEART RATE: 96 BPM | OXYGEN SATURATION: 95 % | DIASTOLIC BLOOD PRESSURE: 74 MMHG | TEMPERATURE: 99.4 F | WEIGHT: 156.86 LBS | SYSTOLIC BLOOD PRESSURE: 120 MMHG | HEIGHT: 63 IN | RESPIRATION RATE: 18 BRPM | BODY MASS INDEX: 27.79 KG/M2

## 2023-12-17 PROCEDURE — 1180000000 HC REHAB R&B

## 2023-12-17 PROCEDURE — 6370000000 HC RX 637 (ALT 250 FOR IP): Performed by: EMERGENCY MEDICINE

## 2023-12-17 RX ADMIN — LOSARTAN POTASSIUM 50 MG: 50 TABLET, FILM COATED ORAL at 08:40

## 2023-12-17 RX ADMIN — DOCUSATE SODIUM 100 MG: 100 CAPSULE, LIQUID FILLED ORAL at 08:40

## 2023-12-17 RX ADMIN — LEVETIRACETAM 500 MG: 500 TABLET, FILM COATED ORAL at 08:40

## 2023-12-17 RX ADMIN — LEVOTHYROXINE SODIUM 88 MCG: 0.05 TABLET ORAL at 05:34

## 2023-12-17 RX ADMIN — LEVETIRACETAM 500 MG: 500 TABLET, FILM COATED ORAL at 21:10

## 2023-12-17 RX ADMIN — DOCUSATE SODIUM 100 MG: 100 CAPSULE, LIQUID FILLED ORAL at 21:10

## 2023-12-17 RX ADMIN — AMLODIPINE BESYLATE 10 MG: 10 TABLET ORAL at 08:40

## 2023-12-17 ASSESSMENT — PAIN SCALES - WONG BAKER
WONGBAKER_NUMERICALRESPONSE: 0

## 2023-12-17 ASSESSMENT — PAIN SCALES - GENERAL
PAINLEVEL_OUTOF10: 0

## 2023-12-18 PROCEDURE — 97535 SELF CARE MNGMENT TRAINING: CPT

## 2023-12-18 PROCEDURE — 97110 THERAPEUTIC EXERCISES: CPT

## 2023-12-18 PROCEDURE — 1180000000 HC REHAB R&B

## 2023-12-18 PROCEDURE — 99232 SBSQ HOSP IP/OBS MODERATE 35: CPT | Performed by: EMERGENCY MEDICINE

## 2023-12-18 PROCEDURE — 97542 WHEELCHAIR MNGMENT TRAINING: CPT

## 2023-12-18 PROCEDURE — 6370000000 HC RX 637 (ALT 250 FOR IP): Performed by: EMERGENCY MEDICINE

## 2023-12-18 PROCEDURE — 97530 THERAPEUTIC ACTIVITIES: CPT

## 2023-12-18 PROCEDURE — 97116 GAIT TRAINING THERAPY: CPT

## 2023-12-18 RX ADMIN — DOCUSATE SODIUM 100 MG: 100 CAPSULE, LIQUID FILLED ORAL at 08:56

## 2023-12-18 RX ADMIN — LEVETIRACETAM 500 MG: 500 TABLET, FILM COATED ORAL at 08:56

## 2023-12-18 RX ADMIN — DOCUSATE SODIUM 100 MG: 100 CAPSULE, LIQUID FILLED ORAL at 21:44

## 2023-12-18 RX ADMIN — LEVETIRACETAM 500 MG: 500 TABLET, FILM COATED ORAL at 21:44

## 2023-12-18 RX ADMIN — LEVOTHYROXINE SODIUM 88 MCG: 0.05 TABLET ORAL at 06:16

## 2023-12-18 ASSESSMENT — PAIN SCALES - GENERAL
PAINLEVEL_OUTOF10: 0

## 2023-12-18 ASSESSMENT — PAIN SCALES - WONG BAKER
WONGBAKER_NUMERICALRESPONSE: 0

## 2023-12-18 NOTE — PLAN OF CARE
Problem: Safety - Adult  Goal: Free from fall injury  Outcome: Progressing     Problem: Respiratory - Adult  Goal: Achieves optimal ventilation and oxygenation  Outcome: Progressing     Problem: Cardiovascular - Adult  Goal: Maintains optimal cardiac output and hemodynamic stability  Outcome: Progressing  Goal: Absence of cardiac dysrhythmias or at baseline  Outcome: Progressing     Problem: Skin/Tissue Integrity - Adult  Goal: Skin integrity remains intact  Outcome: Progressing     Problem: Musculoskeletal - Adult  Goal: Return mobility to safest level of function  Outcome: Progressing  Goal: Maintain proper alignment of affected body part  Outcome: Progressing  Goal: Return ADL status to a safe level of function  Outcome: Progressing     Problem: Gastrointestinal - Adult  Goal: Maintains adequate nutritional intake  Outcome: Progressing     Problem: Genitourinary - Adult  Goal: Absence of urinary retention  Outcome: Progressing     Problem: Infection - Adult  Goal: Absence of infection at discharge  Outcome: Progressing  Goal: Absence of infection during hospitalization  Outcome: Progressing     Problem: Pain  Goal: Verbalizes/displays adequate comfort level or baseline comfort level  Outcome: Progressing     Problem: Skin/Tissue Integrity  Goal: Absence of new skin breakdown  Description: 1. Monitor for areas of redness and/or skin breakdown  2. Assess vascular access sites hourly  3. Every 4-6 hours minimum:  Change oxygen saturation probe site  4. Every 4-6 hours:  If on nasal continuous positive airway pressure, respiratory therapy assess nares and determine need for appliance change or resting period.   Outcome: Progressing     Problem: ABCDS Injury Assessment  Goal: Absence of physical injury  Outcome: Progressing

## 2023-12-18 NOTE — PROGRESS NOTES
Sw attempted to complete LTSS however noted approved screening during pt's recent hospital stay at [facility]. Sw will follow.

## 2023-12-18 NOTE — PLAN OF CARE
Problem: Physical Therapy - Adult  Goal: By Discharge: Performs mobility at highest level of function for planned discharge setting. See evaluation for individualized goals. Description: Physical Therapy Short Term Goals  Initiated 12/2/2023 and to be accomplished within 7 day(s) [12/9/23]  1. Patient will roll right with CGA and use of rail and roll left with modified independence with bed rail. (Goal Met 12/11/23)       Update:  Patient will roll right with modified [facility] and use of rail. To be reassessed 12/16/23 (Goal  Met 12/8/23). 2.  Patient will perform supine to sit and sit to supine in bed with minimal assistance with bed rail. (Goal Met 12/11/23)        Update: Patient will perform supine to sit and sit to supine in bed with SBA with bed rail. To be reassessed 12/16/23 (Goal Met 12/18/23). 3.  Patient will transfer from bed to chair and chair to bed with minimal assistance using the least restrictive device. Goal Met 12/11/23)       Update: Patient will transfer from bed to chair and chair to bed with SBA using the least restrictive device. To be reassessed 12/16/23 (Goal Met supervision 12/18/23)  4. Patient will perform sit to stand with minimal assistance. (Goal Met 12/11/23)       Update: Patient will perform sit to stand with Supervision. To be reassessed 12/16/23 (Goal Met 12/18/23)  5. Patient will ambulate with minimal assistance for 50 feet with the least restrictive device. (Goal Met 12/11/23)       Update: Patient will ambulate with supervision for 200 feet with the least restrictive device. (Goal Met 12/18/23)    6. Patient will propel w/c 150 ft with rohini technique using R UE and R Le and supervision for mobility on unit. (Goal Met 12/11/23)    Physical Therapy Long Term Goals  Initiated 12/2/2023 and to be accomplished within 28 day(s) [12/30/23]  1. Patient will supine to sit, sit to supine, roll right, and roll left in bed with modified independence.     2.  Patient will

## 2023-12-18 NOTE — PLAN OF CARE
Problem: Occupational Therapy - Adult  Description:   Problem: Occupational Therapy - Adult  Goal: By Discharge: Performs self-care activities at highest level of function for planned discharge setting. See evaluation for individualized goals. Description: Occupational Therapy Goals   Long Term Goals  Initiated (12/2/2023) and to be accomplished within 4 week(s)  1. Pt will perform self-feeding with Mod I. ( 12/16 - Mod I)  2. Pt will perform grooming with set-up. ( 12/16 - Mod I)  3. Pt will perform UB bathing with supv. ( 12/16 - Sup)  4. Pt will perform LB bathing with CGA using AE and/ or compensatory strategies as needed. ( 12/16 - SBA)  5. Pt will perform tub/shower transfer with CGA using least restrictive assistive device. ( 12/16 - SBA)  6. Pt will perform UB dressing with SBA using rohini technique. ( 12/16 - s/u)  7. Pt will perform LB dressing with CGA using AE and/ or compensatory strategies as needed. ( 12/16 - SBA)  8. Pt will perform toileting task with CGA. ( 12/16 - Sup)  9. Pt will perform toilet transfer with CGA using least restrictive assistive device. ( 12/16 - Sup)    Short Term Goals   Initiated (12/2/2023) and to be accomplished within 7 day(s), (Updated 12/16)  1. Pt will perform self-feeding with supv. ( 12/7/2023)   2. Pt will perform grooming with CGA. ( 12/7/2023) (Mod I 12/16)  3. Pt will perform UB bathing with Min A. ( 12/7/2023, currently sup)  4. Pt will perform LB bathing with Mod A using AE and/ or compensatory strategies as needed. ( 12/7/2023) (SBA 12/16)  5. Pt will perform tub/shower transfer with Mod A using least restrictive assistive device. ( 12/7/2023) (SBA 12/16)  6. Pt will perform UB dressing with Min A using rohini technique. ( 12/7/2023, currently supervision) (s/u 12/16)  7. Pt will perform LB dressing with Mod A using AE and/ or compensatory strategies as needed. (GM 12/7/2023) (SBA 12/16)  8. Pt will perform toileting task with Mod A.   Functional Level Supervision;Setup   Clinical factors Pt completed all aspects of toileting with (Supv/ set-up) for clothing management and hygiene.     TOILET TRANSFER  Daily Assessment   Transfer score Supervision;Stand by assistance   Comments Stand step transfer (supv/ SBA) using FWW to/ from elevated seat over toilet.     FUNCTIONAL MOBILITY Daily Assessment    Functional - Mobility Device: Rolling Walker  Activity: To/from bathroom  Assist Level: Stand by assistance  Functional Mobility Comments: Functional mobility performed (supv/ SBA) to/ from bathroom using FWW; gait belt.      Comments Functional mobility performed (supv/ SBA) to/ from bathroom using FWW; gait belt.      WHEELCHAIR/BED TRANSFER INDEPENDENCE Daily Assessment   Transfer Technique Stand step   Level of Assistance Supervision;Stand by assistance   Equipment Bed (FWW)   Comments Functional stand step transfer performed (supv/ SBA) using FWW. Verbal cues for hand placement/ foot placement.     ASSESSMENT:  Patient continues to benefit from skilled occupational therapy to address decreased (I) with ADL's, left side weakness, left side inattention, decreased strength/ endurance, decreased balance/ coordination, safety, and impaired functional transfers/ mobility which impact pt performance, independence and safety with ADL/IADL's and functional mobility for return to prior level of functioning.    Progression toward goals:  []          Improving appropriately and progressing toward goals  [x]          Improving slowly and progressing toward goals  []          Not making progress toward goals and plan of care will be adjusted     PLAN:  Patient continues to benefit from skilled intervention to address the above impairments.  Continue treatment per established plan of care.  Discharge Recommendations:  Home Health occupational therapy with 24 hr supv/ assist  Further Equipment Recommendations for Discharge:  shower chair; 3-in-1 commode

## 2023-12-18 NOTE — PROGRESS NOTES
[facility]  [address] [place] [id number]     INPATIENT REHABILITATION  DAILY PROGRESS NOTE     Date: 12/18/2023    Name: Shawn Olivera Age / Sex: 53 y.o. / female   CSN: [id number] MRN: [id number]   Admit Date: 12/1/2023 Length of Stay: 17 days     Primary Rehabilitation Diagnosis: Impaired Mobility and ADLs secondary to acute hemorrhagic in the right parietal lobe and small subarachnoid hemorrhage with residual left hemiparesis      Subjective:     I personally saw and evaluated this patient face-to-face today.  Patient is sitting in a chair in no apparent distress, awake and follows simple commands    Objective:     Vital Signs:  Patient Vitals for the past 24 hrs:   BP Temp Temp src Pulse Resp SpO2   12/18/23 1645 128/82 98 °F (36.7 °C) Oral 96 18 95 %   12/18/23 0845 108/73 97.1 °F (36.2 °C) Oral 99 18 100 %   12/17/23 2115 120/74 99.4 °F (37.4 °C) Oral 96 18 --        Physical Examination:  General:  Awake, alert  Cardiovascular:  S1S2+, RRR  Pulmonary:  CTA b/l  GI:  Soft, BS+, NT, ND  Extremities:  No edema  Left hemiparesis    Current Medications:  Current Facility-Administered Medications   Medication Dose Route Frequency    docusate sodium (COLACE) capsule 100 mg  100 mg Oral BID    amLODIPine (NORVASC) tablet 10 mg  10 mg Oral Daily    levETIRAcetam (KEPPRA) tablet 500 mg  500 mg Oral BID    levothyroxine (SYNTHROID) tablet 88 mcg  88 mcg Oral QAM AC    losartan (COZAAR) tablet 50 mg  50 mg Oral Daily    acetaminophen (TYLENOL) tablet 650 mg  650 mg Oral Q4H PRN    polyethylene glycol (GLYCOLAX) packet 17 g  17 g Oral Daily PRN       Allergies:  No Known Allergies    Lab/Data Review:  No results found for this or any previous visit (from the past 24 hour(s)).        Assessment:     Primary Rehabilitation Diagnosis: Impaired Mobility and ADLs secondary to acute hemorrhagic in the right parietal lobe and small subarachnoid hemorrhage with residual left  goals. Patient is now able to ambulate 282 feet with a rolling walker and standby assistance. In comparison at the time of admission patient was only able to ambulate 10 feet and 5 feet with a rolling walker and a small-based quad cane respectively with moderate assistance    2. Medical Issues being followed closely:    [x]  Fall and safety precautions     []  Wound Care     [x]  Bowel and Bladder Function     [x]  Fluid Electrolyte and Nutrition Balance     []  Pain Control      3. Issues that 24 hour rehabilitation nursing is following:    [x]  Fall and safety precautions     []  Wound Care     [x]  Bowel and Bladder Function     [x]  Fluid Electrolyte and Nutrition Balance     []  Pain Control      [x]  Assistance with and education on in-room safety with transfers to and from the bed, wheelchair, toilet and shower. 4. Acute rehabilitation plan of care:    [x]  Continue current care and rehab. [x]  Physical Therapy           [x]  Occupational Therapy           [x]  Speech Therapy      []  Hold Rehab until further notice     5. Medications:    [x]  MAR Reviewed     [x]  Continue Present Medications       6. Chemical DVT Prophylaxis:      []  Enoxaparin     []  Unfractionated Heparin     []  Warfarin     []  NOAC     []  Aspirin     [x]  None     7. Mechanical DVT Prophylaxis:      [x]  GINA Stockings     [x]  Sequential Compression Device     []  None     8. GI Prophylaxis:      [x]  PPI     []  H2 Blocker     []  None / Not indicated     9. Code status:Full     Personal Protective Equipment ( face mask ) was used while interacting with the patient    Dragon medical dictation software was used for portions of this report. Unintended errors may occur.       Signed:    Vangie Roy MD      December 18, 2023

## 2023-12-19 LAB
ANION GAP SERPL CALC-SCNC: 4 MMOL/L (ref 3–18)
BASOPHILS # BLD: 0 K/UL (ref 0–0.1)
BASOPHILS NFR BLD: 1 % (ref 0–2)
BUN SERPL-MCNC: 17 MG/DL (ref 7–18)
BUN/CREAT SERPL: 15 (ref 12–20)
CALCIUM SERPL-MCNC: 9.8 MG/DL (ref 8.5–10.1)
CHLORIDE SERPL-SCNC: 111 MMOL/L (ref 100–111)
CO2 SERPL-SCNC: 24 MMOL/L (ref 21–32)
CREAT SERPL-MCNC: 1.11 MG/DL (ref 0.6–1.3)
DIFFERENTIAL METHOD BLD: ABNORMAL
EOSINOPHIL # BLD: 0.2 K/UL (ref 0–0.4)
EOSINOPHIL NFR BLD: 5 % (ref 0–5)
ERYTHROCYTE [DISTWIDTH] IN BLOOD BY AUTOMATED COUNT: 13.4 % (ref 11.6–14.5)
GLUCOSE SERPL-MCNC: 86 MG/DL (ref 74–99)
HCT VFR BLD AUTO: 36.7 % (ref 35–45)
HGB BLD-MCNC: 12 G/DL (ref 12–16)
IMM GRANULOCYTES # BLD AUTO: 0 K/UL (ref 0–0.04)
IMM GRANULOCYTES NFR BLD AUTO: 0 % (ref 0–0.5)
LYMPHOCYTES # BLD: 1.2 K/UL (ref 0.9–3.6)
LYMPHOCYTES NFR BLD: 25 % (ref 21–52)
MCH RBC QN AUTO: 27.6 PG (ref 24–34)
MCHC RBC AUTO-ENTMCNC: 32.7 G/DL (ref 31–37)
MCV RBC AUTO: 84.6 FL (ref 78–100)
MONOCYTES # BLD: 0.4 K/UL (ref 0.05–1.2)
MONOCYTES NFR BLD: 8 % (ref 3–10)
NEUTS SEG # BLD: 2.8 K/UL (ref 1.8–8)
NEUTS SEG NFR BLD: 61 % (ref 40–73)
NRBC # BLD: 0 K/UL (ref 0–0.01)
NRBC BLD-RTO: 0 PER 100 WBC
PLATELET # BLD AUTO: 206 K/UL (ref 135–420)
PMV BLD AUTO: 8.8 FL (ref 9.2–11.8)
POTASSIUM SERPL-SCNC: 3.9 MMOL/L (ref 3.5–5.5)
RBC # BLD AUTO: 4.34 M/UL (ref 4.2–5.3)
SODIUM SERPL-SCNC: 139 MMOL/L (ref 136–145)
WBC # BLD AUTO: 4.6 K/UL (ref 4.6–13.2)

## 2023-12-19 PROCEDURE — 99232 SBSQ HOSP IP/OBS MODERATE 35: CPT | Performed by: EMERGENCY MEDICINE

## 2023-12-19 PROCEDURE — 36415 COLL VENOUS BLD VENIPUNCTURE: CPT

## 2023-12-19 PROCEDURE — 85025 COMPLETE CBC W/AUTO DIFF WBC: CPT

## 2023-12-19 PROCEDURE — 1180000000 HC REHAB R&B

## 2023-12-19 PROCEDURE — 80048 BASIC METABOLIC PNL TOTAL CA: CPT

## 2023-12-19 PROCEDURE — 6370000000 HC RX 637 (ALT 250 FOR IP): Performed by: EMERGENCY MEDICINE

## 2023-12-19 PROCEDURE — 97112 NEUROMUSCULAR REEDUCATION: CPT

## 2023-12-19 PROCEDURE — 97535 SELF CARE MNGMENT TRAINING: CPT

## 2023-12-19 PROCEDURE — 97530 THERAPEUTIC ACTIVITIES: CPT

## 2023-12-19 PROCEDURE — 97110 THERAPEUTIC EXERCISES: CPT

## 2023-12-19 PROCEDURE — 97116 GAIT TRAINING THERAPY: CPT

## 2023-12-19 RX ADMIN — LEVETIRACETAM 500 MG: 500 TABLET, FILM COATED ORAL at 20:36

## 2023-12-19 RX ADMIN — DOCUSATE SODIUM 100 MG: 100 CAPSULE, LIQUID FILLED ORAL at 08:27

## 2023-12-19 RX ADMIN — AMLODIPINE BESYLATE 10 MG: 10 TABLET ORAL at 08:27

## 2023-12-19 RX ADMIN — DOCUSATE SODIUM 100 MG: 100 CAPSULE, LIQUID FILLED ORAL at 20:36

## 2023-12-19 RX ADMIN — LEVOTHYROXINE SODIUM 88 MCG: 0.05 TABLET ORAL at 06:08

## 2023-12-19 RX ADMIN — LOSARTAN POTASSIUM 50 MG: 50 TABLET, FILM COATED ORAL at 08:27

## 2023-12-19 RX ADMIN — LEVETIRACETAM 500 MG: 500 TABLET, FILM COATED ORAL at 08:27

## 2023-12-19 ASSESSMENT — PAIN SCALES - GENERAL
PAINLEVEL_OUTOF10: 0

## 2023-12-19 ASSESSMENT — PAIN SCALES - WONG BAKER: WONGBAKER_NUMERICALRESPONSE: 0

## 2023-12-19 NOTE — PLAN OF CARE
Toileting: Setup;Supervision   Clinical factors Toileting performed (Max A) for clothing management and change of brief; pt assisted with anterior hygiene using skin wipes. Pt requiring (Max A) for posterior hygiene. Task simulated as pt did not require toileting at this time. Toileting Skilled Clinical Factors: Pt completed toileting with (set-up/ distant supv) for clothing management and hygiene. Pt voided at this time. TOILET TRANSFER INDEPENDENCE Initial Assessment Discharge Assessment 12/19/2023   Transfer score Moderate assistance Toilet Transfer: Modified independent;Supervision   Toilet transfer comments Functional stand step transfer performed (Mod A) using FWW; gait belt. Verbal cues for hand placement/ foot placement, sequencing, and safety. Intermittent hands on facilitation for securing left hand placement on walker. Toilet Transfers Comments: Stand step transfer (Mod I/ distant supv) using FWW to/ from elevated seat over toilet. WHEELCHAIR/BED TRANSFER INDEPENDENCE Initial Assessment Discharge Assessment 12/19/2023   Transfer Technique   Stand step Stand step   Level of Assistance Moderate assistance Level of Asssistance: Modified independent ;Supervision   Equipment Wheelchair, Other (FWW; gait belt) Bed (FWW)   Comments Functional stand step transfer performed (Mod A) using FWW; gait belt (EOB > w/c). Verbal cues for hand placement/ foot placement, sequencing, and safety. Intermittent hands on facilitation for securing left hand placement on walker. Functional stand step transfer performed (Mod I/ distant supv) using FWW. Please see [facility] Interdisciplinary Eval: Coordination/Balance Section for details regarding FIM score description. Activity Tolerance:   Patient Tolerated treatment well     ASSESSMENT:  Patient improved and progressed toward goals having achieved 9/9 STG's/ LTG's during her inpatient rehab stay.  Therapist reassessed goals based on pt's current functional ability and

## 2023-12-19 NOTE — PLAN OF CARE
Problem: Safety - Adult  Goal: Free from fall injury  Outcome: Progressing     Problem: Cardiovascular - Adult  Goal: Maintains optimal cardiac output and hemodynamic stability  Outcome: Progressing  Flowsheets (Taken 12/18/2023 0800 by Jayme Constantino LPN)  Maintains optimal cardiac output and hemodynamic stability:   Monitor blood pressure and heart rate   Monitor urine output and notify Licensed Independent Practitioner for values outside of normal range

## 2023-12-19 NOTE — PROGRESS NOTES
[facility]  Team Conference  Date: 12/19/2023  ACTIVE MONITORING OF CO-MORBID CONDITIONS/MANAGEMENT OF NEW MEDICAL ISSUES: Intracranial hemorrhage (HCC) [I62.9]    **Please refer to patient's electronic medical record to view the care plan and goals**  NURSING Making gains Yes   Skin Care: Skin Integumentary   Skin Color: Hyperpigmentation    Wound Care: none         Pain: Pain Assessment  Pain Assessment: None - Denies Pain (12/19/23 0800)  Pain Level: 0 (12/19/23 0800)  Townsend-Clarke Pain Rating: No hurt (12/19/23 0800)  Patient's Stated Pain Goal: 0 - No pain (12/19/23 0800)  Pain Location: Head (12/03/23 0818)  Pain Orientation: Posterior (12/03/23 0818)  Pain Descriptors: Aching (12/03/23 0818)  Pain Frequency: Intermittent (12/03/23 0818)  Pain Onset: Gradual (12/03/23 0818)  Non-Pharmaceutical Pain Intervention(s): Food;Repositioned (12/03/23 0818)  Response to Pain Intervention: Patient satisfied (12/03/23 0900)  Side Effects: No reported side effects (12/03/23 0900)    Bladder/Bowel Urine Assessment  Urinary Status: Voiding, Bathroom privileges  Urinary Incontinence: Absent  Urine Color: Yellow/straw  Urine Appearance: Clear  Urine Odor: No odor Stool Assessment  Incontinence: No  Stool Appearance: Formed  Stool Color: Brown  Stool Amount: Large  Stool Source: Rectum  Last BM (including prior to admit): 12/16/23   Goal: Patient will have basic understanding of her medications      Barrier: lack of knowledge       Intervention: education      Lab value concerns   No       Occupational Therapy  Making gains  Yes      Goal: Patient will perform toileting with set up assistance.           Barrier: left inattention, left sided weakness, decreased balance and coordination         Intervention: ADL training, balance training, therex, patient education            ADL Accessibility Limitations:none          Physical Therapy Making gains Yes   Goal: Patient will walk 250 feet modified Mariela Zepeda, OT         [x] Rita Garcia, OT      [x] Judy Lambert, OT                    []  Rosio Payne, DENTON    [x] Neva Colvin, MARLEE        [x] Verónica Sloan RN              [x] Lux Esposito NP  [] RN:                              [] Other:

## 2023-12-19 NOTE — PROGRESS NOTES
Sw delivered rolling walker from [address] Pt previously [address] to [facility]. LTSS completed at [facility]. Sw will follow.

## 2023-12-19 NOTE — PROGRESS NOTES
conducted a Follow up consultation and Spiritual Assessment for Zoila Dan, who is a 46 y.o.,female. The reason patient came to hospital is:   Patient Active Problem List    Diagnosis Date Noted    Intracranial hemorrhage ([address]) 12/01/2023    Intracerebral hemorrhage, nontraumatic ([address]) 11/17/2023   . The  provided the following Interventions:  Continued the relationship of care and support. Listened empathically. Offered prayer and assurance of continued prayer on patients behalf. Chart reviewed. The following outcomes were achieved:  Patient expressed gratitude for 's visit. Assessment:  There are no further spiritual or Mandaen issues which require Spiritual Care Services interventions at this time. Plan:  Chaplains will continue to follow and will provide pastoral care on an as needed/requested basis.  recommends bedside caregivers page  on duty if patient shows signs of acute spiritual or emotional distress.        [facility]  Staff [facility]   [phone]

## 2023-12-20 ENCOUNTER — HOME HEALTH ADMISSION (OUTPATIENT)
Age: 53
End: 2023-12-20
Payer: MEDICAID

## 2023-12-20 VITALS
BODY MASS INDEX: 27.79 KG/M2 | WEIGHT: 156.86 LBS | SYSTOLIC BLOOD PRESSURE: 115 MMHG | DIASTOLIC BLOOD PRESSURE: 76 MMHG | HEIGHT: 63 IN | OXYGEN SATURATION: 95 % | HEART RATE: 91 BPM | TEMPERATURE: 97.7 F | RESPIRATION RATE: 16 BRPM

## 2023-12-20 PROCEDURE — 97116 GAIT TRAINING THERAPY: CPT

## 2023-12-20 PROCEDURE — 99239 HOSP IP/OBS DSCHRG MGMT >30: CPT | Performed by: NURSE PRACTITIONER

## 2023-12-20 PROCEDURE — 6370000000 HC RX 637 (ALT 250 FOR IP): Performed by: EMERGENCY MEDICINE

## 2023-12-20 PROCEDURE — 97530 THERAPEUTIC ACTIVITIES: CPT

## 2023-12-20 PROCEDURE — 97535 SELF CARE MNGMENT TRAINING: CPT

## 2023-12-20 RX ORDER — POLYETHYLENE GLYCOL 3350 17 G/17G
17 POWDER, FOR SOLUTION ORAL DAILY PRN
Qty: 10 EACH | Refills: 0
Start: 2023-12-20

## 2023-12-20 RX ORDER — LEVOTHYROXINE SODIUM 88 UG/1
88 TABLET ORAL
Qty: 30 TABLET | Refills: 0 | Status: SHIPPED | OUTPATIENT
Start: 2023-12-21

## 2023-12-20 RX ORDER — ACETAMINOPHEN 325 MG/1
650 TABLET ORAL EVERY 4 HOURS PRN
Qty: 20 TABLET | Refills: 0
Start: 2023-12-20

## 2023-12-20 RX ORDER — LOSARTAN POTASSIUM 50 MG/1
50 TABLET ORAL DAILY
Qty: 30 TABLET | Refills: 0 | Status: SHIPPED | OUTPATIENT
Start: 2023-12-21

## 2023-12-20 RX ORDER — AMLODIPINE BESYLATE 10 MG/1
10 TABLET ORAL DAILY
Qty: 30 TABLET | Refills: 0 | Status: SHIPPED | OUTPATIENT
Start: 2023-12-21

## 2023-12-20 RX ORDER — LEVETIRACETAM 500 MG/1
500 TABLET ORAL 2 TIMES DAILY
Qty: 60 TABLET | Refills: 0 | Status: SHIPPED | OUTPATIENT
Start: 2023-12-20

## 2023-12-20 RX ADMIN — LEVETIRACETAM 500 MG: 500 TABLET, FILM COATED ORAL at 09:21

## 2023-12-20 RX ADMIN — LOSARTAN POTASSIUM 50 MG: 50 TABLET, FILM COATED ORAL at 09:22

## 2023-12-20 RX ADMIN — AMLODIPINE BESYLATE 10 MG: 10 TABLET ORAL at 09:21

## 2023-12-20 RX ADMIN — DOCUSATE SODIUM 100 MG: 100 CAPSULE, LIQUID FILLED ORAL at 09:21

## 2023-12-20 RX ADMIN — LEVOTHYROXINE SODIUM 88 MCG: 0.05 TABLET ORAL at 07:05

## 2023-12-20 ASSESSMENT — PAIN SCALES - GENERAL
PAINLEVEL_OUTOF10: 0

## 2023-12-20 NOTE — DISCHARGE SUMMARY
[facility]  [address] [place] [id number]     INPATIENT REHABILITATION  DISCHARGE SUMMARY    Name: Shawn Olivera MRN: [id number]   Age / Sex: 53 y.o. / female CSN: [id number]   YOB: 1970 Length of Stay: 19 days   Admit Date: 12/1/2023 Discharge Date: 12/20/2023       PRIMARY CARE PHYSICIAN: Jose Medina DNP    DISCHARGE DIAGNOSES:    Primary Rehabilitation Diagnosis   1. Impaired Mobility and ADLs   2. Acute hemorrhagic in the right parietal lobe and small subarachnoid hemorrhage with residual left hemiparesis     CONSULTS CALLED: None    PROCEDURES DONE: None    BRIEF HISTORY: (from the history and physical completed by Dr. Angel Watson)  The patient is a 53-year-old -American female with past medical history of hypothyroidism who was admitted to [facility] on 11/17/23 due to hemorrhagic stroke and hypertensive emergency.  Patient initially presented to emergency room for further evaluation of headache and left-sided weakness. Patient was found to have intracranial hemorrhage involving right parietal lobe with small volume subarachnoid hemorrhage.  CTA head and neck did not show any aneurysm.  Patient was admitted to ICU and was followed by neurosurgery and neurologist.  Patient was given Cardene drip for hypertensive emergency initially and started on Keppra for seizure precautions.  Patient started getting better clinically but continues to have generalized weakness. PT and OT evaluated this patient for impaired mobility and ADLs. Patient was felt to be a good candidate for acute inpatient rehabilitation. Upon evaluation by Physical Therapy and Occupational Therapy, the patient was recommended for acute inpatient rehabilitation. The patient was discharged and was subsequently admitted to the [facility] for Physical Rehabilitation for intensive rehabilitation to help recover strength, function and mobility.     COURSE IN THE HOSPITAL:  Assistance / Contact Guard Assistance   3 - Moderate Assistance   2 - Maximum Assistance   1 - Total Assistance / Dependent       ACUTE MEDICAL ISSUES ADDRESSED IN INPATIENT REHABILITATION FACILITY:     Primary Rehabilitation Diagnosis: Impaired Mobility and ADLs secondary to acute hemorrhagic in the right parietal lobe and small subarachnoid hemorrhage with residual left hemiparesis     Other comorbid conditions being managed in the rehab  Hypertension  Recent acute kidney injury  Hypokalemia  Hypothyroidism  GERD     Plan     -Impaired Mobility and ADLs secondary to acute hemorrhagic in the right parietal lobe and small subarachnoid hemorrhage with residual left hemiparesis  Physical therapy, Occupational Therapy, speech therapy  Fall precautions  Continue Keppra 500 mg twice daily     -Hypertension  Continue amlodipine 10 mg daily  Continue losartan 50 mg daily  Monitor blood pressure  Blood pressure appears to be well-controlled at this time     -Recent acute kidney injury  Monitor renal function intermittently     -Hypokalemia  Monitor potassium and electrolytes intermittently  On December 19 potassium was 3.9     -Hypothyroidism  Continue Synthroid     -GERD  Continue proton pump inhibitor     -Constipation  Continue Colace  As needed MiraLAX  12/18/23-I discussed with patient and she does not wish for further modification to her bowel regimen       MEDICATIONS ON DISCHARGE:       Medication List        START taking these medications      acetaminophen 325 MG tablet  Commonly known as: TYLENOL  Take 2 tablets by mouth every 4 hours as needed for Pain or Fever     polyethylene glycol 17 g packet  Commonly known as: GLYCOLAX  Take 1 packet by mouth daily as needed for Constipation            CHANGE how you take these medications      levothyroxine 88 MCG tablet  Commonly known as: SYNTHROID  Take 1 tablet by mouth every morning (before breakfast)  Start taking on: December 21, 2023  What changed: when to take

## 2023-12-20 NOTE — PROGRESS NOTES
Prior to discharge, nurse practitioner discussed and went through current and discharge medications in detail with the patient and mother at the bedside. The NP discussed which drugs to discontinue, resume, and continue after discharge. NP explained and answered questions about follow-up care/pcp/specialist appointments, as well as discharge process expectations. The patient expressed her understanding verbally. Patient will be discharged with home health, skilled nursing, PT/OT/wound care. NP discussed discharge process with discharge RN.      Marlys Pang, APRN - CNP

## 2023-12-20 NOTE — PLAN OF CARE
Performed family education training with the patient's mother to include bed mobility training, transfer training (to include car transfers), stair training and gait training.

## 2023-12-20 NOTE — FLOWSHEET NOTE
12/20/23 1417   AVS Reviewed   AVS & discharge instructions reviewed with patient and/or representative? Yes   Reviewed instructions with Patient   Level of Understanding Questions answered; Teach back completed;Verbalized understanding       Cherelle Rodríguez 46 y.o. female was discharged home on 12/20/23. Patient's armband removed and given to patient to take home. Patient was informed of the privacy risks if armband lost or stolen. Discharge instructions were reviewed with patient, and she verbalized understanding. The patient was wheeled out to transportation vehicle by a staff member along with the patient's belongings. Transportation was provided by private vehicle.     Fanny Villareal RN

## 2023-12-20 NOTE — PLAN OF CARE
Problem: Safety - Adult  Goal: Free from fall injury  Outcome: Progressing     Problem: Respiratory - Adult  Goal: Achieves optimal ventilation and oxygenation  Outcome: Progressing     Problem: Cardiovascular - Adult  Goal: Maintains optimal cardiac output and hemodynamic stability  Outcome: Progressing

## 2023-12-20 NOTE — PROGRESS NOTES
TEAM CONFERENCE FOLLOW-UP  Patient: Zoila Dan (89 y.o. female)  Date: 12/20/2023  Diagnosis: Intracranial hemorrhage (HCC) [I62.9] Intracranial hemorrhage ([address])      Precautions:      Met with patient to discuss the findings from 12/20/2023 Team Conference.     Patient requested further information and/or had questions:   Myra Kan

## 2023-12-20 NOTE — HOME CARE
Received home health referral from [facility] for [facility] for SN,PT,OT. Discharge order noted for today; spoke to patient and her mother in room, Verified demographics,explained [facility] services ,answered all questions and provided patient with [facility] contact card ; Patient states her mother and neighbor will be assisting her after discharge; Patient states she has DME: RW ordered from [facility] from [address] [place] [address] and states that a [facility] and shower chair  will be delivered to her home; [facility] referral processed to [facility] central Intake and scheduling . DAVID ISSA LPN.

## 2023-12-20 NOTE — PROGRESS NOTES
Time In: 1130; Time Out: 1200 (Caregiver edu session)  Patient and pt's Mom present for caregiver education session. Therapist introduced self and role of occupational therapy on Acute Rehab Unit. Therapist discussed plan of care and pt's current level of assistance with ADL's. Patient completed UB/ LB dressing at this time while seated at edge of bed. Pt donned pullover shirt and bra (set-up) level; pt donned elastic waist pants (set-up/ supv). Pt donned socks and slip on sneakers (set-up/ Mod I) seated at edge of bed using crossed leg technique. Pt performed toileting (set-up) level for clothing management and hygiene; pt voided at this time. Functional ambulation performed (Mod I/ distant supv) using FWW within pt's room environment. Pt completed hand hygiene while in stance at sink. Discussed recommendation for 3-in-1 commode and shower chair. Reviewed RUE/ LUE red theraband exercises for carryover to home environment. Pt and pt's Mom verbalizing understanding of information provided. Therapist recommending home health/ occupational therapy with 24 hr supv.    Jinny Jorge, OT

## 2023-12-23 ENCOUNTER — HOME CARE VISIT (OUTPATIENT)
Age: 53
End: 2023-12-23
Payer: MEDICAID

## 2023-12-23 PROCEDURE — G0299 HHS/HOSPICE OF RN EA 15 MIN: HCPCS

## 2023-12-26 ENCOUNTER — HOME CARE VISIT (OUTPATIENT)
Age: 53
End: 2023-12-26
Payer: MEDICAID

## 2023-12-26 VITALS
HEART RATE: 90 BPM | DIASTOLIC BLOOD PRESSURE: 74 MMHG | TEMPERATURE: 98.8 F | RESPIRATION RATE: 16 BRPM | SYSTOLIC BLOOD PRESSURE: 134 MMHG | OXYGEN SATURATION: 99 %

## 2023-12-26 VITALS
SYSTOLIC BLOOD PRESSURE: 120 MMHG | HEART RATE: 97 BPM | TEMPERATURE: 97.3 F | OXYGEN SATURATION: 99 % | DIASTOLIC BLOOD PRESSURE: 78 MMHG | RESPIRATION RATE: 16 BRPM

## 2023-12-26 PROCEDURE — G0151 HHCP-SERV OF PT,EA 15 MIN: HCPCS

## 2023-12-26 NOTE — HOME HEALTH
Skilled services/Home bound verification:  PATIENT AND CAREGIVER UNDERSTAND HOMEBOUND STATUS. Skilled Reason for admission/summary of clinical condition: Small subarachnoid hemorrhage with residual left hemiparesis. This patient is homebound for the following reasons Requires considerable and taxing effort to leave the home , Requires the assistance of 1 or more persons to leave the home  and Only leaves the home for medical reasons or [employer] services and are infrequent and of short duration for other reasons . Caregiver: is mother, available 24/7  Caregiver assists with . Assists with ADLs, Medication management, Transportation to appointments, Meal prep and assists with ambulation. .    Medications reconciled and all medications are available in the home this visit. The following education was provided regarding medications: All medications should be given the same time daily. Medications  are effective at this time. Patient on no high risk medications at this time. Home health supplies by type and quantity ordered/delivered this visit include: n/a    Patient education provided this visit to include: Went over the 5 signs and symptoms of CVA. [place] We also discussed the risk for another Stroke. Encouraged to eat a Low sodium diet. MEDICATION ADHERENCE IS AN IMPORTANT COMPONENT OF HTN MANAGEMENT. PATIENT STATES THE IMPORTANCE TO TAKE HTN MEDS SAME TIME EACH DAY. PATIENT SHOULD EAT A DIET LOW IN SODIUM, WE WENT OVER HOW TO READ A LABEL AND WHAT FOODS TO STAY AWAY FROM. Patient is a fall risk, I recommend supervision at all times, keeping walk ways/halls clear of clutter. Patient level of understanding of education provided: patient and CG seemed to have good understanding of material presented. Patient response to procedure performed:  patient was engaged and cooperative through out [facility] interview.      Home exercise program/Homework provided: Continue to

## 2023-12-26 NOTE — CASE COMMUNICATION
SOC done on patient on 12/23/23. Ambulating with walker. SN/PT/OT  all ordered and accepted by leon.

## 2023-12-26 NOTE — HOME HEALTH
Pt is referred to home care for SN/PT/OT s/p SAH with L hemiparesis    PMHx:   Hypothyroid   Hypertension  Recent acute kidney injury  Hypokalemia  Hypothyroidism  GERD    SUBJECTIVE: \"My right leg still feels numb\"  LIVING SITUATION/PLOF: Pt lives alone in a 2nd floor apt with an elevator. PTA pt was I with all mobility in home and community w/o AD. CAREGIVER INVOLVEMENT/ ASSISTANCE NEEDED FOR: Transportation, mobility, ADL's  MEDICATIONS REVIEWED AND UPDATED: Meds reviewed with no changes  NEXT MD APPT: 1/3 with PCP    EQUIIPMENT: RW, BSC, shower chair  ROM: B LE's WFL  STRENGTH: 5 STS = 22.9 sec. L LE hip grossly 4/5; knee and ankle 5/5. R LE grossly 5/5  WOUNDS: No wounds  BED MOBILITY: I supine<>sit and rolling L/R  TRANSFERS: S sit<>stand from varied surfaces  GAIT: Pt amb in the apt x 50' with RW and S.  CG to amb w/o AD. Gait characterized by small steps with feet very close together, almost scissoring  STAIRS: NT  BALANCE: Pt scored 38/56 on TORIBIO Balance Assessment placing her at moderate risk for falls. HEP consisting of:  1. Walking every hour during the day with RW and S  2. Seated: hip flexion, LAQ, ankle pumps  3. Deep breathing   Patient/caregiver verbalized understanding but will need reinforcement for consistency and progression. PATIENT EDUCATION PROVIDED THIS VISIT: safety, HEP, walking, deep breathing    PATIENT RESPONSE TO EDUCATION PROVIDED: Pt/CG verbalizes understanding of all information and demo's back as appropriate   PATIENT RESPONSE TO EVALUATION/TREATMENT: Patient demonstrated a positive outcome post treatment and reported increased comfort and increased confidence with transfers and mobility. Patient reported good understanding of the HEP and reports confidence in ability to complete the program as prescribed by PT.    ASSESSMENT AND CONTINUED NEED FOR THE FOLLOWING SKILLS:  Pt is s/p SAH with mild L hemiparesis. She lives alone in a 2nd floor apt with elevator access.

## 2023-12-29 ENCOUNTER — HOME CARE VISIT (OUTPATIENT)
Age: 53
End: 2023-12-29
Payer: MEDICAID

## 2023-12-29 PROCEDURE — G0157 HHC PT ASSISTANT EA 15: HCPCS

## 2023-12-30 ENCOUNTER — HOME CARE VISIT (OUTPATIENT)
Age: 53
End: 2023-12-30
Payer: MEDICAID

## 2023-12-30 VITALS
RESPIRATION RATE: 16 BRPM | HEART RATE: 89 BPM | DIASTOLIC BLOOD PRESSURE: 80 MMHG | TEMPERATURE: 98 F | OXYGEN SATURATION: 99 % | SYSTOLIC BLOOD PRESSURE: 122 MMHG

## 2023-12-30 NOTE — HOME HEALTH
SUBJECTIVE: Patient reported feeling good this afternoon and had no c/o pain.  CAREGIVER INVOLVEMENT/ASSISTANCE NEEDED FOR: Patient uses [employer] for transportation.  HOME HEALTH SUPPLIES BY TYPE AND QUANTITY ORDERED/DELIVERED THIS VISIT INCLUDE: None needed for this visit.  OBJECTIVE:  See interventions.  PATIENT RESPONSE TO TREATMENT: Patient felt good after walking and exercising. She had no c/o pain.  PATIENT LEVEL OF UNDERSTANDING OF EDUCATION PROVIDED: Needs [id number]  ASSESSMENT OF PROGRESS TOWARD GOALS: Patient addressed goals for gait, transfers, and strength. Pt needed SBA for gait training today to ambulate safely. Patient ambulated with decreased letty, decreased foot clearance, and forward flexed trunk. Gave multiple vc's for pt to lift B foot higher to clear floor safely and to maintain erect posture. Pt needs reinforcement for safety with gait and to improve posture. Pt also performed sit < > stand transfer SBA from different level surfaces. Gave vc's for pt to lean forward and to push off using B UE to stand up. Pt needs reinforcement to improve transfer technique. In addition, instructed pt on performing seated therapeutic exercises and to comply with HEP.  CONTINUED NEED FOR THE FOLLOWING SKILLS: Gait Training, Stairs Training, Transfer Training, Bed Mobility Training, Balance Training, and Therapeutic Exercises.  PLAN FOR NEXT VISIT: Patient will be seen 2w3 to increase safety with gait, to increase safety with stairs, to improve transfer technique, to improve bed mobility technique, to improve balance, and to increase strength of B LE.  DISCHARGE PLANNING DISCUSSED WITH PATIENT/CAREGIVER: Discharge patient to family with MD supervision when all goals are met. Pt/Caregiver did verbalize understanding of discharge planning.

## 2024-01-02 ENCOUNTER — HOME CARE VISIT (OUTPATIENT)
Age: 54
End: 2024-01-02
Payer: MEDICAID

## 2024-01-02 VITALS
OXYGEN SATURATION: 100 % | RESPIRATION RATE: 17 BRPM | SYSTOLIC BLOOD PRESSURE: 128 MMHG | TEMPERATURE: 97.4 F | DIASTOLIC BLOOD PRESSURE: 82 MMHG | HEART RATE: 81 BPM

## 2024-01-02 PROCEDURE — G0152 HHCP-SERV OF OT,EA 15 MIN: HCPCS

## 2024-01-02 PROCEDURE — G0157 HHC PT ASSISTANT EA 15: HCPCS

## 2024-01-02 NOTE — HOME HEALTH
Post-Partum Discharge Instructions      Self-Care  Diet  • Drink at least 8 glasses of water a day.   • Eat foods high in fiber, raw vegetables, fruit, and whole wheat.  • Choose foods high in iron.  • Continue to take prenatal vitamin until your 6 week visit or while breastfeeding.    Bowel and bladder function  • Bowel function usually occurs 2-3 days after birth  • Normal urination patterns should resume by one week.  • Cleanse area after urination and pat dry.  • If experiencing hemorrhoids use Tucks pads, ointments and warm water soaks  • Your doctor may recommend a stool softener.    Bleeding  • Vaginal discharge usually lasts 3-6 weeks after birth  • First 2-3 days will be moderately heavy, bright to dark red, and may include small clots  • Lochia will gradually decrease in amount with the color turning pink to brown to creamy white and colorless  • Change pads frequently.  Odor should be  similar to menstrual flow  • Lochia may increase with strenuous exercise and breastfeeding even after it has slowed  • Do not use tampons until after your 6 week visit     Activity  • Listen to your body. If bleeding and fatigue increase, you are probably doing too much.  • Okay to shower  • Do not soak in tub until incision healed  • Do not drive while on pain medications or as directed by your provider     Birth  · Keep incision clean and pat dry  · Limit stair climbing.   No vacuuming or strenuous housework  · Short frequent walks are best  · Lift nothing heavier than 15 lbs for 2 weeks  · No driving for 2 weeks or while on pain meds  · Staples will dissolve  · Can take up to 6 weeks for incision to heal        Contraception  • Wait until cessation of vaginal bleeding following doctor instructions to engage in intercourse  • Breastfeeding is not a reliable form of birth control  • Discuss contraception with your provider      Mood/Emotions    Becoming a mother involves many changes to your mind and body. Although  you may have expected to be excited and happy, instead you may be unsure of yourself in your new role, and you may find that you are easily upset, impatient, irritable or tearful. This is normal and comes and goes quickly. \"Baby blues\" may occur anywhere from a few days after delivery to several weeks postpartum and will pass in a short time.     Postpartum Depression:   Postpartum depression goes beyond \"baby blues\" and is persistent, intense, and severe. The most common symptom is a feeling of deep sadness. You may also feel as if you just can't cope with life. Other symptoms include:   • thoughts of harming yourself or the baby   • lack of interest in the baby, family, or friends   • feeling guilty or worthless   • feeling hopeless   • uncontrollable crying   • feelings of being a bad mother   • trouble sleeping, oversleeping or feeling tired all the time   • changes in appetite   • strong feelings of irritability, anger, or nervousness   • having trouble thinking clearly or making decisions   • having headaches, aches and pains, or stomach problems that won't go away   • thinking about death or suicide     The exact cause of postpartum depression is unknown. Changes in brain chemistry or structure are believed to play a big role in depression.?It may be due to changes in your hormones during and after childbirth. You may also be tired from caring for your baby and adjusting to being a mother. All these factors may make you feel depressed. In some cases, your genes may also play a role.     Postpartum depression can be treated in many ways. Talking to your healthcare provider is the first step toward feeling better.   Call your doctor or midwife immediately if you:   • Cry for no clear reason   • Have trouble sleeping, eating, and making choices   • Question whether you can handle caring for your baby   • Have intense feelings of sadness, anxiety, or despair that prevent you from being able to do your daily tasks      Here are some additional resources offering support for Postpartum Depression:   • Postpartum Support International: (112) 875-3942   • Mom's Mental Health Initiative ([facility]) https://momsmentalhealthmke.org/   • Mental Health [facility] (333) 306-2052 or (376) 704-8921   • National [place] for Mental Health: (662) 254-4982   • National Suicide Prevention (074) 529-4143   • Postpartum Progress https://postpartumprogress.com/     Medications    • Tylenol (Acetaminophen). Dose 650mg. Take by mouth every 4 hours as needed for pain or mild fever.  Do not take while taking Norco.   • Ibuprofen (MOTRIN). Dose 600mg. Take by mouth every 6 hours as needed for cramping. Take with food to prevent stomach upset.       When to notify your provider  • A temperature greater than 101.0 degrees F.  • Any increased pain that is not relieved by pain medication and normal comfort measures  • If you experience signs and symptoms of a blood clot:  -uneven swelling in one leg  -warmth, pain, tenderness in the lower leg  • If you experience signs and symptoms of postpartum pre-eclampsia:    -high blood pressure of 140/90 or greater    -severe headaches    -changes in vision, including temporary loss of vision, blurred vision or   light sensitivity    -swelling of the face and limbs    -upper abdominal pain, usually under the ribs on the right side    -nausea or vomiting    -decreased urination    -sudden weight gain (typically more than 2 pounds a week)  • If you have a red, painful, hard spot in the breast along with fever or flu like symptoms.  • Chest pain or shortness of breath  • Vaginal discharge that becomes foul smelling  • Vaginal bleeding that is soaking more than one pad in an hour or less, is bright red or if you are passing clots larger than a golf ball  • Difficulty urinating, increased urinary frequency, burning with urination or difficulty emptying your bladder.  • You having difficulty with  rehabilitation. Upon evaluation by Physical Therapy and Occupational Therapy, the patient was recommended for acute inpatient rehabilitation. The patient was disch arged and was subsequently admitted to the [facility] for Physical Rehabilitation for intensive rehabilitation to help recover strength, function and mobility.     COURSE IN THE HOSPITAL: Upon admission to the [facility] for Physical Rehabilitation, the patient underwent physical therapy, occupational therapy and speech therapy. The patient was able to actively participate in the rehabilitation activities and progressed well. On discharge, the patient was able to perform the following activities:     Primary Rehabilitation Diagnosis: Impaired Mobility and ADLs secondary to acute hemorrhagic in the right parietal lobe and small subarachnoid hemorrhage with residual left hemiparesis     Other comorbid conditions being managed in the rehab   Hypertension   Recent acute kidney injury   Hypokalemia   Hypothyroidism   GERD bowel movements or have not had a bowel movement within a week of delivery  • Increased swelling, drainage or pain from your incision  • You have symptoms of postpartum depression  • You have severe persistent chills  • You have fainting, dizziness, or lightheadedness    Help when you go home  Arrange for help after discharge. People often offer help to new parents. You can utilize their help with the care of other children, meal preparation, basic errands, or allow someone you trust to care for your baby while you rest or nap.    We would like to thank you for choosing [facility] for your birthing experience.  In order to provide excellent care, we will make a follow-up phone call in the next few days.

## 2024-01-04 ENCOUNTER — HOME CARE VISIT (OUTPATIENT)
Age: 54
End: 2024-01-04
Payer: MEDICAID

## 2024-01-04 VITALS
DIASTOLIC BLOOD PRESSURE: 60 MMHG | TEMPERATURE: 97.3 F | OXYGEN SATURATION: 94 % | HEART RATE: 88 BPM | SYSTOLIC BLOOD PRESSURE: 102 MMHG | RESPIRATION RATE: 16 BRPM

## 2024-01-04 PROCEDURE — G0157 HHC PT ASSISTANT EA 15: HCPCS

## 2024-01-04 NOTE — HOME HEALTH
SUBJECTIVE: Patient is in good spirits  CAREGIVER INVOLVEMENT/ASSISTANCE NEEDED FOR: Patient has assist from friends and palns to [employer] to MD medeirost tomorrow  HOME HEALTH SUPPLIES BY TYPE AND QUANTITY ORDERED/DELIVERED THIS VISIT INCLUDE: None  OBJECTIVE:  See interventions.  PATIENT RESPONSE TO TREATMENT:  Good-no report of increased pain following treatment and required several sitting rest breaks throughout  PATIENT LEVEL OF UNDERSTANDING OF EDUCATION PROVIDED: Instructed with fall risk prevention and adjusted rollator handle for equal hieght  ASSESSMENT OF PROGRESS TOWARD GOALS: Patient was able to increase overall safety with gait with FWW, rollator and transfers after instruction by return demonstration-continues to need reinforcement of locking and unlocking rollator brakes, hand placement with sit to stand and overall B foot clearance and to increase MACK after instruction able to improve all slightly..  Progressing well towards goals.  Feel repetition of safety cues will increase consistency.  Instructed patient to amb every waking hour and to try to do more activities in standing throughout the day.  CONTINUED NEED FOR THE FOLLOWING SKILLS: Patient will be seen 2 x week for Physical Therapy to continue to work toward goals within POC and HHPT is medically necessary to address  dx and clinical findings: decreased ROM, decreased strength, impaired gait, decreased ability w stair negotiation, increased swelling, decreased bed mobility, decreased transfer status, decreased endurance, decreased balance and decreased safety, increased pain in order to improve functional mobility/quality of life, decrease burden of care, reduce risk for re-hospitalization, work towards patient's personal goals of return to PLOF w decrease risk for falls.  PLAN FOR NEXT VISIT: Gait/transfer training, Progression of HEP  THE FOLLOWING DISCHARGE PLANNING WAS DISCUSSED WITH THE PATIENT/CAREGIVER: This is visit number 3  out of   8

## 2024-01-04 NOTE — CASE COMMUNICATION
Occupational Therapy Evaluation    1w1    Ms. Olivera is ambulating with use of a RW in her home.  She states that she prefers to sponge bathe despite having a walk in shower and shower seat due to concerns for slipping.  She reports that she wanted to install a grab bar to help with balance when getting in/out.  Suggested a vertical bar at entry to shower.  Pt stated that her mother was going to help with this but they ran out of time b efore she needed to head back to [place].  Suggested pt purchase one through local [employer] and inquire about contractors that will install.  Pt agreeable to consider.  Asked pt is she was interested in having further OT visits to perform shower transfer training and ADL training at the shower level to improve safety and confidence.  Pt politely declines for now stating that she feels fine with sponge bathing.  She is gathering h er own clothing and dressing self unassisted.  She reports compliance with rohini technique.  Suggested purchase of a walker basket or tray to help transport items and reduce risk of falls.  Provided pt information on website to order DME as it is not covered by insurance.  Ms. Olivera reports that she has returned to her own meal prep and light house chores.  Educated pt on use of energy conservation strategies such as sitting during func tional tasks, taking frequent rest breaks and keeping frequently used items within reach.  Advised pt to sit during prolonged tasks such as meal prep, washing dishes or at the bathroom sink for bathing.  Pt agreeable.  Will d/c OT at this time as pt is preforming ADL/IADL tasks unassisted and she declines further OT to advance to the shower level or for higher level IADL training at the RW level.

## 2024-01-05 VITALS
SYSTOLIC BLOOD PRESSURE: 104 MMHG | TEMPERATURE: 98.4 F | DIASTOLIC BLOOD PRESSURE: 58 MMHG | RESPIRATION RATE: 16 BRPM | HEART RATE: 60 BPM

## 2024-01-05 NOTE — HOME HEALTH
SUBJECTIVE:  No reported med changes from MD appt yesterday.  CAREGIVER INVOLVEMENT/ASSISTANCE NEEDED FOR: Patient has some assist from friends for errands  HOME HEALTH SUPPLIES BY TYPE AND QUANTITY ORDERED/DELIVERED THIS VISIT INCLUDE: None  OBJECTIVE:  See interventions.  PATIENT RESPONSE TO TREATMENT: Good no report of pain from treatment  PATIENT LEVEL OF UNDERSTANDING OF EDUCATION PROVIDED: Instruction with fall risk prevention  ASSESSMENT OF PROGRESS TOWARD GOALS: Good carryover with gait trainining Mod I after instruction with rollator and able to amb 5 min on even surfaces and instructed to  continue to amb 5 min with slow increase in time as part of HEP to propmote increased endurance and strengthening.  Good tolerance of balance activities-backward walking required cues for increased step length. progressing towards goal and for patient's personal goal to amb without restorator/FWW.  CONTINUED NEED FOR THE FOLLOWING SKILLS: Patient will be seen 2 x week for Physical Therapy to continue to work toward goals within POC and HHPT is medically necessary to address  dx and clinical findings: decreased ROM, decreased strength, impaired gait, decreased ability w stair negotiation, increased swelling, decreased bed mobility, decreased transfer status, decreased endurance, decreased balance and decreased safety, increased pain in order to improve functional mobility/quality of life, decrease burden of care, reduce risk for re-hospitalization, work towards patient's personal goals of return to PLOF w decrease risk for falls.  PLAN FOR NEXT VISIT: Theraband exercises, restorator  THE FOLLOWING DISCHARGE PLANNING WAS DISCUSSED WITH THE PATIENT/CAREGIVER: This is visit number 4  out of  8  planned visits.  NEXT MD APPT:[facility]

## 2024-01-06 ENCOUNTER — HOME CARE VISIT (OUTPATIENT)
Age: 54
End: 2024-01-06
Payer: MEDICAID

## 2024-01-08 ENCOUNTER — HOME CARE VISIT (OUTPATIENT)
Age: 54
End: 2024-01-08
Payer: MEDICAID

## 2024-01-08 VITALS
SYSTOLIC BLOOD PRESSURE: 124 MMHG | OXYGEN SATURATION: 100 % | RESPIRATION RATE: 16 BRPM | HEART RATE: 81 BPM | DIASTOLIC BLOOD PRESSURE: 80 MMHG | TEMPERATURE: 98.8 F

## 2024-01-08 VITALS — TEMPERATURE: 98.7 F | SYSTOLIC BLOOD PRESSURE: 112 MMHG | RESPIRATION RATE: 16 BRPM | DIASTOLIC BLOOD PRESSURE: 60 MMHG

## 2024-01-08 PROCEDURE — G0299 HHS/HOSPICE OF RN EA 15 MIN: HCPCS

## 2024-01-08 PROCEDURE — G0157 HHC PT ASSISTANT EA 15: HCPCS

## 2024-01-08 ASSESSMENT — ENCOUNTER SYMPTOMS: CONSTIPATION: 1

## 2024-01-09 NOTE — HOME HEALTH
Skilled reason for visit: disease and medication teaching    Caregiver involvement:uses [employer] to get to appts.    Medications reviewed and all medications are available in the home this visit.    The following education was provided regarding medications:  reviewed all medication bottles in home for frequency, side effects and precautions. verbalized understanding and repeat back        Medications are effective at this time.      Home health supplies by type and quantity ordered/delivered this visit include: NA    Patient education provided this visit: see intervention tab.      Patient level of understanding of education provided: see intervention tab for level of understanding.    Patient response to procedure performed:  NA    Agency Progress toward goals: see intervention tab for progressing toward goals        Patient's Progress towards personal goals:  see intervention tab for progressing toward goals        Home exercise program: cont to take all medications/diet as prescrived, follow pressure ulcer precautions, follow all fall precautions and use any assistive devices reccomended by therapy or medical providers, reported any abnormal s/sx of CVA to ER immediately. disease process teaching packets/ home care booklet for reference. call home care for any concerns/questions. keep all medical appts.      Continued need for the following skills: Nursing.    Plan for next visit: DISEASE AND MEDICATION     Patient and/or caregiver notified and agrees to changes in the Plan of Care: N/A.     The following discharge planning was discussed with the pt/caregiver: Will discharge when all  disease process, complication and dietary goals are met and understands all medication purpose/frequencies/side effects

## 2024-01-10 ENCOUNTER — HOME CARE VISIT (OUTPATIENT)
Age: 54
End: 2024-01-10
Payer: MEDICAID

## 2024-01-10 PROCEDURE — G0157 HHC PT ASSISTANT EA 15: HCPCS

## 2024-01-11 VITALS
DIASTOLIC BLOOD PRESSURE: 60 MMHG | SYSTOLIC BLOOD PRESSURE: 110 MMHG | TEMPERATURE: 98.7 F | RESPIRATION RATE: 16 BRPM | OXYGEN SATURATION: 99 % | HEART RATE: 86 BPM

## 2024-01-11 NOTE — HOME HEALTH
SUBJECTIVE: Patient reports compliance with HEP.  Patient reports some fatigue today.    CAREGIVER INVOLVEMENT/ASSISTANCE NEEDED FOR: Patient has some assist from friends for errands    HOME HEALTH SUPPLIES BY TYPE AND QUANTITY ORDERED/DELIVERED THIS VISIT INCLUDE: None    OBJECTIVE:  See interventions.    PATIENT RESPONSE TO TREATMENT: Good no report of pain following treatment    PATIENT LEVEL OF UNDERSTANDING OF EDUCATION PROVIDED: Instruction with fall risk prevention    ASSESSMENT OF PROGRESS TOWARD GOALS: Instructed with balance exercises that were challenging for patient and able to progress HEP.  Patient now able to sit to stand without B UE to push up.  FSTST 1 x 5 32 sec-increased time from eval.  Good carryover with gait with good quality with rollator and much more confident with rollator.  Progressing well towards goals  CONTINUED NEED FOR THE FOLLOWING SKILLS: Patient will be seen 2 x week for Physical Therapy to continue to work toward goals within POC and HHPT is medically necessary to address  dx and clinical findings: decreased ROM, decreased strength, impaired gait, decreased ability w stair negotiation, increased swelling, decreased bed mobility, decreased transfer status, decreased endurance, decreased balance and decreased safety, increased pain in order to improve functional mobility/quality of life, decrease burden of care, reduce risk for re-hospitalization, work towards patient's personal goals of return to PLOF w decrease risk for falls.    PLAN FOR NEXT VISIT: Balance retraining, strengthening exercises, trial of single point cane for AD    THE FOLLOWING DISCHARGE PLANNING WAS DISCUSSED WITH THE PATIENT/CAREGIVER: This is visit number 5  out of  8  planned visits.    NEXT MD APPT:[facility]

## 2024-01-11 NOTE — HOME HEALTH
SUBJECTIVE: Patient rpeorts complliance with exercises  CAREGIVER INVOLVEMENT/ASSISTANCE NEEDED FOR: Patient has family and friends that are able to offer assistance as needed  HOME HEALTH SUPPLIES BY TYPE AND QUANTITY ORDERED/DELIVERED THIS VISIT INCLUDE: None  OBJECTIVE:  See interventions.  PATIENT RESPONSE TO TREATMENT:  Good-no pain reported  PATIENT LEVEL OF UNDERSTANDING OF EDUCATION PROVIDED: Patient instructed with fall risk prevention. Instructed patient to return to FWW if pain/edema increase, if ill, dizziness or with fatigue, and inclement weather-while on pain meds to utilize FWW for night trips to the bathroom-patient agreed  ASSESSMENT OF PROGRESS TOWARD GOALS: Able to advance patient from FWW to straight cane to increase functional independence-good quality of gait after instruction by return demonstration-gaining confidence with cane as AD.  Mod I with sit to stand transfers form sofa.  Progressing well towards goals.  CONTINUED NEED FOR THE FOLLOWING SKILLS: Patient will be seen 2 x week for Physical Therapy to continue to work toward goals within POC and HHPT is medically necessary to address  dx and clinical findings: decreased ROM, decreased strength, impaired gait, decreased ability w stair negotiation, increased swelling, decreased bed mobility, decreased transfer status, decreased endurance, decreased balance and decreased safety, increased pain in order to improve functional mobility/quality of life, decrease burden of care, reduce risk for re-hospitalization, work towards patient's personal goals of return to PLOF w decrease risk for falls.  PLAN FOR NEXT VISIT: Floor and car transfers, stair mobility  THE FOLLOWING DISCHARGE PLANNING WAS DISCUSSED WITH THE PATIENT/CAREGIVER: This is visit number 6  out of  8  planned visits.  NEXT MD APPT:Feb with Neurology

## 2024-01-15 ENCOUNTER — HOME CARE VISIT (OUTPATIENT)
Age: 54
End: 2024-01-15
Payer: MEDICAID

## 2024-01-15 PROCEDURE — G0157 HHC PT ASSISTANT EA 15: HCPCS

## 2024-01-16 VITALS
SYSTOLIC BLOOD PRESSURE: 110 MMHG | OXYGEN SATURATION: 98 % | DIASTOLIC BLOOD PRESSURE: 60 MMHG | HEART RATE: 90 BPM | RESPIRATION RATE: 16 BRPM | TEMPERATURE: 99.5 F

## 2024-01-16 NOTE — HOME HEALTH
swelling, decreased bed mobility, decreased transfer status, decreased endurance, decreased balance and decreased safety, increased pain in order to improve functional mobility/quality of life, decrease burden of care, reduce risk for re-hospitalization, work towards patient's personal goals of return to PLOF w decrease risk for falls.  PLAN FOR NEXT VISIT: Discharge Assessment from Supervising Physical Therapist  THE FOLLOWING DISCHARGE PLANNING WAS DISCUSSED WITH THE PATIENT/CAREGIVER: This is visit number 9  out of 10   planned visits.  NEXT MD APPT:

## 2024-01-17 ENCOUNTER — HOME CARE VISIT (OUTPATIENT)
Age: 54
End: 2024-01-17
Payer: MEDICAID

## 2024-01-17 PROCEDURE — G0300 HHS/HOSPICE OF LPN EA 15 MIN: HCPCS

## 2024-01-17 NOTE — CASE COMMUNICATION
Assessment and Summary of Care:  Patient's current functional status before discharge is as follows  Strength: 17.9 seconds -sit to stand 1 x 5  ROM:  N/T  Bed Mobility: Independent  Transfers: Mod I from sofa, chair, car, and floor to stand transfers; Independent with supine to and from sit  Gait/WC mobility: Able to amb 200' with straight cane on even anduneven surfaces Mod I with good B foot clearance and each foot passing the other   Stairs: Mod I on stairs with step to pattern and rail and cane for support to get to first floor  Special Tests: Will need a TORIBIO test  Recommendations: Discussed upcoming discharge with patient and she is in agreement.  She doesn't want to go to putpatient therapy however will continue her HEP and instructed patient to discuss this with her Neurologist at next appt.

## 2024-01-18 ENCOUNTER — HOME CARE VISIT (OUTPATIENT)
Age: 54
End: 2024-01-18
Payer: MEDICAID

## 2024-01-18 VITALS
HEART RATE: 76 BPM | OXYGEN SATURATION: 98 % | TEMPERATURE: 98.1 F | SYSTOLIC BLOOD PRESSURE: 118 MMHG | DIASTOLIC BLOOD PRESSURE: 78 MMHG | RESPIRATION RATE: 16 BRPM

## 2024-01-18 VITALS
RESPIRATION RATE: 18 BRPM | TEMPERATURE: 97.9 F | OXYGEN SATURATION: 97 % | HEART RATE: 77 BPM | DIASTOLIC BLOOD PRESSURE: 64 MMHG | SYSTOLIC BLOOD PRESSURE: 118 MMHG

## 2024-01-18 PROCEDURE — G0151 HHCP-SERV OF PT,EA 15 MIN: HCPCS

## 2024-01-18 NOTE — HOME HEALTH
SUBJECTIVE:\"I think I'm doing well\"  REQUIRES CAREGIVER ASSISTANCE FOR: Nothing  MEDICATIONS REVIEWED AND RECONCILED Med rec with no issues  NEXT MD APPT: TBD    ROM:  B VIPUL's WFL.  ROM at eval:  B VIPUL's WFL  STRENGTH: 5 STS = 13.9 sec.  Strength at eval: 5 STS = 22.9 sec. L LE hip grossly 4/5; knee and ankle 5/5.  R LE grossly 5/5  WOUNDS:No wounds.  Wound status at eval: No wounds  BED MOBILITY: I supine<>sit and rolling L/R.  Bed mobility at eval: I supine<>sit and rolling L/R  TRANSFERS: I sit<>stand from varied surfaces.  Pt reports I car transfer. Pt reports she is not getting in the shower yet as she does not have grab bars installed.  Transfers at eval: S sit<>stand from varied surfaces  GAIT: Pt amb MOD I in the home and outside (per LPTA report) using walker or SPC.  Pt has ordered a cane and expects delivery tomorrow.  Gait characterized by equal step length, normal MACK, heel toe pattern.  Gait at eval: Pt amb in the apt x 50' with RW and S.  CG to amb w/o AD.  Gait characterized by small steps with feet very close together, almost scissoring  STAIRS: Per LPTA report pt amb up/down a flight of steps MOD I with SPC and rail.  Stairs at [facility] NT  BALANCE: Pt scored 51/56 on TORIBIO Balance Assessment placing her at low risk for falls.  Balance at eval: Pt scored 38/56 on TORIBIO Balance Assessment placing her at moderate risk for falls.       HEP consisting of:  Walking every hour during the day with AD   Seated: hip flexion, LAQ, ankle pumps   standing exercises with support of the counter: B heel/toe raises, sidestepping along counter x 3, B hamstring curls and Marching 1 x 10-15 as carmen; with theraband wiwth walker for with support hip flex, hip abd, hip add, and hip ext each leg 1 x 10 th orange theraband  sit to stand 1 x 10    in supine: bridging, bridging with B hip add, and B SLR 1 x 10  Written HEP issued, patient/caregiver verbalized understanding.     PATIENT RESPONE TO TX: Pt demo'd positive response to

## 2024-01-18 NOTE — HOME HEALTH
Skilled reason for visit: Education    Caregiver involvement: No one was there during visit.    Medications reviewed and all medications are available in the home this visit.    The following education was provided regarding medications:  Continue to take medication as ordered.    MD notified of any discrepancies/look a-like medications/medication interactions: n/a  Medications are effective at this time.      Home health supplies by type and quantity ordered/delivered this visit include: n/a    Patient education provided this visit: See Interventions    Sharps education provided: n/a    Patient level of understanding of education provided: Alert and Orientated    Patient response to procedure performed:  n/a    Agency Progress toward goals: Education    Patient's Progress towards personal goals: States she feels good     Home exercise program: Fall Precautions     Continued need for the following skills: Nursing.    Plan for next visit: n/a    Patient and/or caregiver notified and agrees to changes in the Plan of Care: Yes.     The following discharge planning was discussed with the pt/caregiver: Discharge this visit.